# Patient Record
Sex: FEMALE | Race: WHITE | NOT HISPANIC OR LATINO | Employment: UNEMPLOYED | URBAN - METROPOLITAN AREA
[De-identification: names, ages, dates, MRNs, and addresses within clinical notes are randomized per-mention and may not be internally consistent; named-entity substitution may affect disease eponyms.]

---

## 2017-03-15 ENCOUNTER — APPOINTMENT (OUTPATIENT)
Dept: CT IMAGING | Facility: CLINIC | Age: 57
End: 2017-03-15
Attending: NURSE PRACTITIONER
Payer: COMMERCIAL

## 2017-03-15 ENCOUNTER — HOSPITAL ENCOUNTER (EMERGENCY)
Facility: CLINIC | Age: 57
Discharge: HOME | End: 2017-03-15
Attending: NURSE PRACTITIONER | Admitting: NURSE PRACTITIONER
Payer: COMMERCIAL

## 2017-03-15 VITALS
WEIGHT: 180 LBS | SYSTOLIC BLOOD PRESSURE: 119 MMHG | BODY MASS INDEX: 28.19 KG/M2 | DIASTOLIC BLOOD PRESSURE: 71 MMHG | TEMPERATURE: 97.6 F | RESPIRATION RATE: 18 BRPM | HEART RATE: 74 BPM | OXYGEN SATURATION: 99 %

## 2017-03-15 DIAGNOSIS — R10.13 ABDOMINAL PAIN, EPIGASTRIC: ICD-10-CM

## 2017-03-15 LAB
ALBUMIN SERPL-MCNC: 3.7 G/DL (ref 3.4–5)
ALBUMIN UR-MCNC: NEGATIVE MG/DL
ALP SERPL-CCNC: 70 U/L (ref 40–150)
ALT SERPL W P-5'-P-CCNC: 22 U/L (ref 0–50)
ANION GAP SERPL CALCULATED.3IONS-SCNC: 8 MMOL/L (ref 3–14)
APPEARANCE UR: CLEAR
AST SERPL W P-5'-P-CCNC: 17 U/L (ref 0–45)
BACTERIA #/AREA URNS HPF: ABNORMAL /HPF
BILIRUB SERPL-MCNC: 0.4 MG/DL (ref 0.2–1.3)
BILIRUB UR QL STRIP: NEGATIVE
BUN SERPL-MCNC: 14 MG/DL (ref 7–30)
CALCIUM SERPL-MCNC: 8.5 MG/DL (ref 8.5–10.1)
CHLORIDE SERPL-SCNC: 106 MMOL/L (ref 94–109)
CO2 SERPL-SCNC: 28 MMOL/L (ref 20–32)
COLOR UR AUTO: ABNORMAL
CREAT SERPL-MCNC: 0.84 MG/DL (ref 0.52–1.04)
D DIMER PPP FEU-MCNC: 0.4 UG/ML FEU (ref 0–0.5)
ERYTHROCYTE [DISTWIDTH] IN BLOOD BY AUTOMATED COUNT: 14.7 % (ref 10–15)
GFR SERPL CREATININE-BSD FRML MDRD: 70 ML/MIN/1.7M2
GLUCOSE SERPL-MCNC: 77 MG/DL (ref 70–99)
GLUCOSE UR STRIP-MCNC: NEGATIVE MG/DL
HCT VFR BLD AUTO: 37 % (ref 35–47)
HGB BLD-MCNC: 11.9 G/DL (ref 11.7–15.7)
HGB UR QL STRIP: NEGATIVE
INTERPRETATION ECG - MUSE: NORMAL
KETONES UR STRIP-MCNC: NEGATIVE MG/DL
LEUKOCYTE ESTERASE UR QL STRIP: ABNORMAL
LIPASE SERPL-CCNC: 105 U/L (ref 73–393)
MCH RBC QN AUTO: 28.8 PG (ref 26.5–33)
MCHC RBC AUTO-ENTMCNC: 32.2 G/DL (ref 31.5–36.5)
MCV RBC AUTO: 90 FL (ref 78–100)
MUCOUS THREADS #/AREA URNS LPF: PRESENT /LPF
NITRATE UR QL: NEGATIVE
PH UR STRIP: 5 PH (ref 5–7)
PLATELET # BLD AUTO: 263 10E9/L (ref 150–450)
POTASSIUM SERPL-SCNC: 3.4 MMOL/L (ref 3.4–5.3)
PROT SERPL-MCNC: 6.5 G/DL (ref 6.8–8.8)
RBC # BLD AUTO: 4.13 10E12/L (ref 3.8–5.2)
RBC #/AREA URNS AUTO: <1 /HPF (ref 0–2)
SODIUM SERPL-SCNC: 142 MMOL/L (ref 133–144)
SP GR UR STRIP: 1 (ref 1–1.03)
SQUAMOUS #/AREA URNS AUTO: <1 /HPF (ref 0–1)
TROPONIN I SERPL-MCNC: NORMAL UG/L (ref 0–0.04)
URN SPEC COLLECT METH UR: ABNORMAL
UROBILINOGEN UR STRIP-MCNC: 0 MG/DL (ref 0–2)
WBC # BLD AUTO: 5.5 10E9/L (ref 4–11)
WBC #/AREA URNS AUTO: 2 /HPF (ref 0–2)

## 2017-03-15 PROCEDURE — 81001 URINALYSIS AUTO W/SCOPE: CPT | Performed by: NURSE PRACTITIONER

## 2017-03-15 PROCEDURE — 85027 COMPLETE CBC AUTOMATED: CPT | Performed by: NURSE PRACTITIONER

## 2017-03-15 PROCEDURE — 25000128 H RX IP 250 OP 636: Performed by: NURSE PRACTITIONER

## 2017-03-15 PROCEDURE — 84484 ASSAY OF TROPONIN QUANT: CPT | Performed by: NURSE PRACTITIONER

## 2017-03-15 PROCEDURE — 99285 EMERGENCY DEPT VISIT HI MDM: CPT | Mod: 25

## 2017-03-15 PROCEDURE — 96360 HYDRATION IV INFUSION INIT: CPT

## 2017-03-15 PROCEDURE — 96361 HYDRATE IV INFUSION ADD-ON: CPT

## 2017-03-15 PROCEDURE — 80053 COMPREHEN METABOLIC PANEL: CPT | Performed by: NURSE PRACTITIONER

## 2017-03-15 PROCEDURE — 83690 ASSAY OF LIPASE: CPT | Performed by: NURSE PRACTITIONER

## 2017-03-15 PROCEDURE — 74177 CT ABD & PELVIS W/CONTRAST: CPT

## 2017-03-15 PROCEDURE — 93005 ELECTROCARDIOGRAM TRACING: CPT

## 2017-03-15 PROCEDURE — 25500064 ZZH RX 255 OP 636: Performed by: NURSE PRACTITIONER

## 2017-03-15 PROCEDURE — 85379 FIBRIN DEGRADATION QUANT: CPT | Performed by: NURSE PRACTITIONER

## 2017-03-15 RX ORDER — SUCRALFATE ORAL 1 G/10ML
1 SUSPENSION ORAL 4 TIMES DAILY
Qty: 200 ML | Refills: 0 | Status: SHIPPED | OUTPATIENT
Start: 2017-03-15 | End: 2017-03-20

## 2017-03-15 RX ORDER — IOPAMIDOL 755 MG/ML
500 INJECTION, SOLUTION INTRAVASCULAR ONCE
Status: COMPLETED | OUTPATIENT
Start: 2017-03-15 | End: 2017-03-15

## 2017-03-15 RX ADMIN — IOPAMIDOL 91 ML: 755 INJECTION, SOLUTION INTRAVENOUS at 15:54

## 2017-03-15 RX ADMIN — SODIUM CHLORIDE 1000 ML: 9 INJECTION, SOLUTION INTRAVENOUS at 14:03

## 2017-03-15 RX ADMIN — SODIUM CHLORIDE 63 ML: 9 INJECTION, SOLUTION INTRAVENOUS at 15:54

## 2017-03-15 ASSESSMENT — ENCOUNTER SYMPTOMS
DIARRHEA: 0
NAUSEA: 0
CONSTIPATION: 0
COUGH: 0
VOMITING: 0
BACK PAIN: 1
ABDOMINAL PAIN: 1
SHORTNESS OF BREATH: 0

## 2017-03-15 NOTE — ED NOTES
Pt here with chest pain and back pain that has been ongoing to 2 weeks, pt has tried multiple things, heat pad, massage, ibuprofen without relief. Denies any more symptoms.

## 2017-03-15 NOTE — ED NOTES
Pt felt she is unable to provide urine sample at this time and would like IV fluids to continue for a while before attempting to give sample.

## 2017-03-15 NOTE — ED AVS SNAPSHOT
[facility] Emergency Department    [address]    [facility] [address]    Phone:  [phone]    Fax:  [phone]                                       Colleen Connelly   MRN: [id number]    Department:  [facility] Emergency Department   Date of Visit:  3/15/2017           After Visit Summary Signature Page     I have received my discharge instructions, and my questions have been answered. I have discussed any challenges I see with this plan with the nurse or doctor.    ..........................................................................................................................................  Patient/Patient Representative Signature      ..........................................................................................................................................  Patient Representative Print Name and Relationship to Patient    ..................................................               ................................................  Date                                            Time    ..........................................................................................................................................  Reviewed by Signature/Title    ...................................................              ..............................................  Date                                                            Time

## 2017-03-15 NOTE — ED AVS SNAPSHOT
[facility] Emergency Department    [address]    [facility] [address]    Phone:  [phone]    Fax:  [phone]                                       Colleen Connelly   MRN: [id number]    Department:  [facility] Emergency Department   Date of Visit:  3/15/2017           Patient Information     Date Of Birth          1960        Your diagnoses for this visit were:     Abdominal pain, epigastric        You were seen by Shashi Haro APRN CNP.      Follow-up Information     Follow up with Saray Simpson In 1 week.    Specialty:  Physician Assistant    Why:  if continuned symptoms or sooner if worsening    Contact information:    [facility]  [address] [place] [address] [phone]          Follow up with [facility] Emergency Department.    Specialty:  EMERGENCY MEDICINE    Why:  If symptoms worsen    Contact information:    [address]  [facility] [id number] [phone]        Discharge Instructions         To help with constipation you can try Miralax, or Senna.  If you what to move things alone very quickly try Magnesium citrate.     Epigastric Pain (Uncertain Cause)    Epigastric pain can be a sign of disease in the upper abdomen. Common causes include:    Acid reflux (stomach acid flowing up into the esophagus)    Gastritis (irritation of the stomach lining)    Peptic Ulcer Disease    Inflammation of the pancreas    Gallstone    Infection in the gallbladder  Pain may be dull or burning. It may spread upward to the chest or to the back. There may be other symptoms such as belching, bloating, cramps or hunger pains. There may be weight loss or poor appetite, nausea or vomiting.  Since the diagnosis of your pain is not certain yet, further tests may sometimes be needed. Sometimes the doctor will treat you for the most likely condition to see if there is improvement before doing further tests.  Home  care  Medicines    Antacids help neutralize the normal acids in your stomach. Examples are Maalox, Mylanta, Rolaids, and Tums. If you don t like the liquid, you can also try a chewable one. You may find one works better than another for you. Overuse can cause diarrhea or constipation.    Acid blockers (H2 blockers) decrease acid production. Examples are cimetidine (Tagamet), famotidine (Pepcid) and ranitidine (Zantac).    Acid inhibitors (PPIs) decrease acid production in a different way than the blockers. You may find they work better, but can take a little longer to take effect.  Examples are omeprazole (Prilosec), lansoprazole (Prevacid), pantoprazole (Protonix), rabeprazole (Aciphex), and esomeprazole (Nexium).    Take an antacid 30-60 minutes after eating and at bedtime, but not at the same time as an acid blocker.    Try not to take NSAIDs. Aspirin may also cause problems, but if taking it for your heart or other medical reasons, talk to your doctor before stopping it; you do not want to cause a worse problem, like a heart attack or stroke.  Diet    If certain foods seem to cause your spasm, try to avoid them.     Eat slowly and chew food well before swallowing. Symptoms of gastritis can be worsened by certain foods. Limit or avoid fatty, fried, and spicy foods, as well as coffee, chocolate, mint, and foods with high acid content such as tomatoes and citrus fruit and juices (orange, grapefruit, lemon).    Avoid alcohol, caffeine, and tobacco, which can delay healing and worsen your problem.    Try eating smaller meals with snacks in between  Follow-up care  Follow up with your healthcare provider or as advised.  When to seek medical advice  Call your healthcare provider right away if any of the following occur:    Stomach pain worsens or moves to the right lower part of the abdomen    Chest pain appears, or if it worsens or spreads to the chest, back, neck, shoulder, or arm    Frequent vomiting (can t keep  down liquids)    Blood in the stool or vomit (red or black color)    Feeling weak or dizzy, fainting, or having trouble breathing    Fever of 100.4 F (38 C) or higher, or as directed by your healthcare provider    Abdominal swelling                3197-1670 [employer]. [address] [place] [address] 10472. All rights reserved. This information is not intended as a substitute for professional medical care. Always follow your healthcare professional's instructions.          24 Hour Appointment Hotline       To make an appointment at any [facility], call [phone] ([phone]). If you don't have a family doctor or clinic, we will help you find one. [facility] clinics are conveniently located to serve the needs of you and your family.             Review of your medicines      START taking        Dose / Directions Last dose taken    omeprazole 20 MG CR capsule   Commonly known as:  priLOSEC   Dose:  20 mg   Quantity:  30 capsule        Take 1 capsule (20 mg) by mouth daily   Refills:  0        sucralfate 1 GM/10ML suspension   Commonly known as:  CARAFATE   Dose:  1 g   Quantity:  200 mL        Take 10 mLs (1 g) by mouth 4 times daily for 5 days   Refills:  0          Our records show that you are taking the medicines listed below. If these are incorrect, please call your family doctor or clinic.        Dose / Directions Last dose taken    BENADRYL ALLERGY PO        Refills:  0        IMITREX PO        Refills:  0        PARoxetine 10 MG tablet   Commonly known as:  PAXIL   Dose:  10 mg   Quantity:  20 tablet        Take 1 tablet (10 mg) by mouth At Bedtime   Refills:  1                Prescriptions were sent or printed at these locations (2 Prescriptions)                   Other Prescriptions                Printed at Department/Unit printer (2 of 2)         sucralfate (CARAFATE) 1 GM/10ML suspension               omeprazole (PRILOSEC) 20 MG CR capsule                Procedures and tests  performed during your visit     CBC (platelets, no diff)    CT Abdomen Pelvis w Contrast    Comprehensive metabolic panel    D dimer quantitative    EKG 12 lead    Lipase    Saline Lock IV    Troponin I    UA reflex to Microscopic      Orders Needing Specimen Collection     None      Pending Results     Date and Time Order Name Status Description    3/15/2017 1451 CT Abdomen Pelvis w Contrast Preliminary             Pending Culture Results     No orders found from 3/13/2017 to 3/16/2017.             Test Results from your hospital stay     3/15/2017  2:30 PM - Interface, Flexilab Results      Component Results     Component Value Ref Range & Units Status    WBC 5.5 4.0 - 11.0 10e9/L Final    RBC Count 4.13 3.8 - 5.2 10e12/L Final    Hemoglobin 11.9 11.7 - 15.7 g/dL Final    Hematocrit 37.0 35.0 - 47.0 % Final    MCV 90 78 - 100 fl Final    MCH 28.8 26.5 - 33.0 pg Final    MCHC 32.2 31.5 - 36.5 g/dL Final    RDW 14.7 10.0 - 15.0 % Final    Platelet Count 263 150 - 450 10e9/L Final         3/15/2017  2:50 PM - Interface, Flexilab Results      Component Results     Component Value Ref Range & Units Status    Sodium 142 133 - 144 mmol/L Final    Potassium 3.4 3.4 - 5.3 mmol/L Final    Chloride 106 94 - 109 mmol/L Final    Carbon Dioxide 28 20 - 32 mmol/L Final    Anion Gap 8 3 - 14 mmol/L Final    Glucose 77 70 - 99 mg/dL Final    Urea Nitrogen 14 7 - 30 mg/dL Final    Creatinine 0.84 0.52 - 1.04 mg/dL Final    GFR Estimate 70 >60 mL/min/1.7m2 Final    Non  GFR Calc    GFR Estimate If Black 84 >60 mL/min/1.7m2 Final    African American GFR Calc    Calcium 8.5 8.5 - 10.1 mg/dL Final    Bilirubin Total 0.4 0.2 - 1.3 mg/dL Final    Albumin 3.7 3.4 - 5.0 g/dL Final    Protein Total 6.5 (L) 6.8 - 8.8 g/dL Final    Alkaline Phosphatase 70 40 - 150 U/L Final    ALT 22 0 - 50 U/L Final    AST 17 0 - 45 U/L Final         3/15/2017  2:50 PM - Interface, Flexilab Results      Component Results     Component Value  Ref Range & Units Status    Lipase 105 73 - 393 U/L Final         3/15/2017  2:50 PM - Interface, Flexilab Results      Component Results     Component Value Ref Range & Units Status    Troponin I ES  0.000 - 0.045 ug/L Final    <0.015  The 99th percentile for upper reference range is 0.045 ug/L.  Troponin values in   the range of 0.045 - 0.120 ug/L may be associated with risks of adverse   clinical events.           3/15/2017  2:43 PM - Interface, Flexilab Results      Component Results     Component Value Ref Range & Units Status    D Dimer 0.4 0.0 - 0.50 ug/ml FEU Final    This D-dimer assay is intended for use in conjuntion with a clinical pretest   probability assessment model to exclude pulmonary embolism (PE) and as an aid   in the diagnosis of deep venous thrombosis (DVT) in outpatients suspected of   PE   or DVT. The cut-off value is 0.5 g/mL FEU.           3/15/2017  3:32 PM - Interface, Flexilab Results      Component Results     Component Value Ref Range & Units Status    Color Urine Straw  Final    Appearance Urine Clear  Final    Glucose Urine Negative NEG mg/dL Final    Bilirubin Urine Negative NEG Final    Ketones Urine Negative NEG mg/dL Final    Specific Gravity Urine 1.003 1.003 - 1.035 Final    Blood Urine Negative NEG Final    pH Urine 5.0 5.0 - 7.0 pH Final    Protein Albumin Urine Negative NEG mg/dL Final    Urobilinogen mg/dL 0.0 0.0 - 2.0 mg/dL Final    Nitrite Urine Negative NEG Final    Leukocyte Esterase Urine Trace (A) NEG Final    Source Midstream Urine  Final    RBC Urine <1 0 - 2 /HPF Final    WBC Urine 2 0 - 2 /HPF Final    Bacteria Urine Few (A) NEG /HPF Final    Squamous Epithelial /HPF Urine <1 0 - 1 /HPF Final    Mucous Urine Present (A) NEG /LPF Final         3/15/2017  4:21 PM - Interface, Radiant Ib      Narrative     CT ABDOMEN AND PELVIS WITH CONTRAST  3/15/2017 4:00 PM     HISTORY: Pain.    TECHNIQUE: Volumetric acquisition through abdomen and pelvis with IV  contrast.  91  mL Isovue-370.  Radiation dose for this scan was reduced  using automated exposure control, adjustment of the mA and/or kV  according to patient size, or iterative reconstruction technique.    COMPARISON: 8/10/2014.    FINDINGS: Liver and spleen are negative. Gallbladder is absent.  Pancreas, adrenal glands and kidneys are negative. No hydronephrosis.  Mild linear atelectasis or scarring in the lingula. Visualized lung  bases are otherwise clear.    Uterus is present. No suspicious pelvic masses. Appendix is near the  upper limits of normal for size measuring 0.7 to 0.8 cm in diameter  without adjacent inflammation and is felt to be within normal limits.  Moderate stool in the colon. Nonspecific mild fat deposition in the  wall of the right colon. Portions of the colon appear minimally  thick-walled, for example in the region of the hepatic and splenic  flexures. This is probably physiologic, but mild colitis is not  completely excluded. No bowel obstruction, ascites or free air. Mild  degenerative and hypertrophic changes in the visualized spine. Mild  curve convex to the left in the lumbar spine.        Impression     IMPRESSION:  1. Slight apparent wall thickening involving portions of the  transverse colon, probably physiologic, though mild colitis cannot be  completely excluded. Correlate clinically.  2. No other acute cause of pain identified.  3. Moderate stool in the colon.                Clinical Quality Measure: Blood Pressure Screening     Your blood pressure was checked while you were in the emergency department today. The last reading we obtained was  BP: 119/71 . Please read the guidelines below about what these numbers mean and what you should do about them.  If your systolic blood pressure (the top number) is less than 120 and your diastolic blood pressure (the bottom number) is less than 80, then your blood pressure is normal. There is nothing more that you need to do about it.  If your systolic  "blood pressure (the top number) is 120-139 or your diastolic blood pressure (the bottom number) is 80-89, your blood pressure may be higher than it should be. You should have your blood pressure rechecked within a year by a primary care provider.  If your systolic blood pressure (the top number) is 140 or greater or your diastolic blood pressure (the bottom number) is 90 or greater, you may have high blood pressure. High blood pressure is treatable, but if left untreated over time it can put you at risk for heart attack, stroke, or kidney failure. You should have your blood pressure rechecked by a primary care provider within the next 4 weeks.  If your provider in the emergency department today gave you specific instructions to follow-up with your doctor or provider even sooner than that, you should follow that instruction and not wait for up to 4 weeks for your follow-up visit.        Thank you for choosing [facility]       Thank you for choosing [facility] for your care. Our goal is always to provide you with excellent care. Hearing back from our patients is one way we can continue to improve our services. Please take a few minutes to complete the written survey that you may receive in the mail after you visit with us. Thank you!        [facility] Information     [employer] lets you send messages to your doctor, view your test results, renew your prescriptions, schedule appointments and more. To sign up, go to [email or web address] . Click on \"Log in\" on the left side of the screen, which will take you to the Welcome page. Then click on \"Sign up Now\" on the right side of the page.     You will be asked to enter the access code listed below, as well as some personal information. Please follow the directions to create your username and password.     Your access code is: [email or web address]  Expires: 2017  4:40 PM     Your access code will  in 90 days. If you need help or a new code, please call your [facility] clinic or " [phone].        Care EveryWhere ID     This is your Care EveryWhere ID. This could be used by other organizations to access your [facility] medical records  [id number]        After Visit Summary       This is your record. Keep this with you and show to your community pharmacist(s) and doctor(s) at your next visit.

## 2017-03-15 NOTE — ED PROVIDER NOTES
History     Chief Complaint:  Chest Pain, Abdominal Pain, and Back Pain     HPI   Colleen Connelly is a 56 year old female who presents for evaluation of chest pain, abdominal pain, and back pain. For about the past two weeks, the patient has had sharp mild epigastric abdominal pain with radiation into her back. She cannot identify any exacerbating or alleviating factors for this pain. She has never experienced similar pain. The patient has attempted ibuprofen, heating pads, and massages to manage her pain without relief. Currently in the ED, the patient rates her pain at a severity of 8/10. She has not had any nausea, vomiting, diarrhea, constipation, cough, or shortness of breath in association with her current pain, and she has not traveled recently.     Cardiac Risk Factors:  CAD:    Negative   Hypertension:   Negative   Hyperlipidemia:  Negative   Diabetes:   Negative   Tobacco use:   Negative   Gender:   Female  Age:    56  Familial Hx of CAD:  Negative      PE/DVT Risk Factors:   Hx of PE/DVT:   Negative   Hx of clotting disorder:  Negative   Hx of cancer:    Negative   Tobacco use:    Negative   Hormone therapy:   Negative   Pregnancy:    Negative   Prolonged immobilization:  Negative   Recent surgery:   Negative   Recent travel:    Negative   Familial Hx of PE/DVT:  Negative      Allergies:  NKDA     Medications:    SUMAtriptan Succinate (IMITREX PO)  PARoxetine (PAXIL) 10 MG tablet  DiphenhydrAMINE HCl (BENADRYL ALLERGY PO)  Ibuprofen     Past Medical History:    Anxiety  Migraine headaches     Past Surgical History:    Cholecystectomy  Cosmetic surgery     Family History:    History reviewed. No pertinent family history.     Social History:  Tobacco use:    Never smoker  Alcohol use:    Positive, rarely  Marital status:       Accompanied to ED by:  Alone      Review of Systems   Respiratory: Negative for cough and shortness of breath.    Cardiovascular: Positive for chest pain.    Gastrointestinal: Positive for abdominal pain. Negative for constipation, diarrhea, nausea and vomiting.   Musculoskeletal: Positive for back pain.   All other systems reviewed and are negative.    Physical Exam   First Vitals:  BP: 132/74  Pulse: 74  Temp: 97.6  F (36.4  C)  Resp: 18  Weight: 81.6 kg (180 lb)  SpO2: 96 %      Physical Exam  General: Alert, Mild  discomfort, well kept  Eyes: PERRL, conjunctivae pink no scleral icterus or conjunctival injection  ENT:   Moist mucus membranes, posterior oropharynx clear without erythema or exudates, No lymphadenopathy, Normal voice  Resp:  Lungs clear to auscultation bilaterally, no crackles/rubs/wheezes. Good air movement  CV:  Normal rate and rhythm, no murmurs/rubs/gallops  GI:  Abdomen soft and non-distended.  Normoactive BS.  Right lower quadrant tenderness, No guarding or rebound, No masses  Skin:  Warm, dry.  No rashes or petechiae  Musculoskeletal: Right sternal border tenderness, No peripheral edema or calf tenderness, Normal gross ROM   Neuro: Alert and oriented to person/place/time, normal sensation  Psychiatric: Normal affect, cooperative, good eye contact    Emergency Department Course   ECG (13:46:27):  Indication: Screening for cardiovascular disease.   Rate 62 bpm. RI interval 160 ms. QRS duration 86 ms. QT/QTc 434/440 ms. P-R-T axes 45 -9 11.   Interpretation: Normal sinus rhythm, Minimal voltage criteria for LVH may be normal variant, Borderline ECG  Agree with computer interpretation. Yes   Interpreted at 1349 by Dr. Taylor.      Imaging:  Radiographic findings were communicated with the family who voiced understanding of the findings.    CT abdomen Pelvis w Contrast:  IMPRESSION:  1. Slight apparent wall thickening involving portions of the transverse colon, probably physiologic, though mild colitis cannot be completely excluded. Correlate clinically.  2. No other acute cause of pain identified.  3. Moderate stool in the colon.  Preliminary report  per radiology.     Laboratory:  CBC: WNL (WBC 5.5, HGB 11.9, )  CMP: Protein total: 6.5 low, o/w WNL (Creatinine 0.84)  Lipase: 105  Troponin I 1406: <0.015  D dimer quantitative: 0.4   UA reflex to Microscopic: Trace leukocyte esterase, Few bacteria, Mucous present, o/w Negative       Interventions:  1403 NS 1,000 mL IV     Emergency Department Course:  Nursing notes and vitals reviewed.  1353: I performed an exam of the patient as documented above.     1627: I updated and reassessed the patient.     I personally reviewed the laboratory results with the Patient and answered all related questions prior to discharge.      Findings and plan explained to the Patient. Patient discharged home with instructions regarding supportive care, medications, and reasons to return. The importance of close follow-up was reviewed. The patient was prescribed Prilosec and Carafate.      Impression & Plan      Medical Decision Making:  Colleen Connelly is a 56 year old female who presented for evaluation of mid epigastric discomfort that radiates to her back. This has been ongoing for several days, however due to continued discomfort she presented for evaluation. Her examination showed mid epigastric tenderness. Therefore, CT scan was obtained without indication for acute intraabdominal process. There is some slight thickening that may represent mild colitis. This is consistent with the patients history. Her laboratory studies today show no acute findings. She has a negative troponin, negative D dimer, and normal liver function test as well as lipase. Her electrolytes were normal as well as a normal white cell count. She does not have a urinary tract infection. CT scan did indicate a fair amount of stool throughout her colon. This may represent a source of her discomfort as well as colitis. Therefore, she is advised to try over the counter remedies to help with constipation. She also describes symptoms of reflux and is therefore  given a prescription for sucralfate and omeprazole.  No indication of AAA. Follow up with primary care provider within one week's time if no improvement, sooner with worsening. She will return to the ER if she develops increased pain not helped with the above treatments, difficulty breathing, or with other concerns.     Diagnosis:    ICD-10-CM   1. Abdominal pain, epigastric R10.13       Disposition:  Discharged to home with Prilosec and Carafate.     Discharge Medications:  New Prescriptions    OMEPRAZOLE (PRILOSEC) 20 MG CR CAPSULE    Take 1 capsule (20 mg) by mouth daily    SUCRALFATE (CARAFATE) 1 GM/10ML SUSPENSION    Take 10 mLs (1 g) by mouth 4 times daily for 5 days         Kade LÓPEZ, am serving as a scribe at 1:53 PM on 3/15/2017 to document services personally performed by Shashi Haro NP, based on my observations and the provider's statements to me.    [facility] EMERGENCY DEPARTMENT       Shashi Haro APRN CNP  03/15/17 9260

## 2017-03-15 NOTE — DISCHARGE INSTRUCTIONS
To help with constipation you can try Miralax, or Senna.  If you what to move things alone very quickly try Magnesium citrate.     Epigastric Pain (Uncertain Cause)    Epigastric pain can be a sign of disease in the upper abdomen. Common causes include:    Acid reflux (stomach acid flowing up into the esophagus)    Gastritis (irritation of the stomach lining)    Peptic Ulcer Disease    Inflammation of the pancreas    Gallstone    Infection in the gallbladder  Pain may be dull or burning. It may spread upward to the chest or to the back. There may be other symptoms such as belching, bloating, cramps or hunger pains. There may be weight loss or poor appetite, nausea or vomiting.  Since the diagnosis of your pain is not certain yet, further tests may sometimes be needed. Sometimes the doctor will treat you for the most likely condition to see if there is improvement before doing further tests.  Home care  Medicines    Antacids help neutralize the normal acids in your stomach. Examples are Maalox, Mylanta, Rolaids, and Tums. If you don t like the liquid, you can also try a chewable one. You may find one works better than another for you. Overuse can cause diarrhea or constipation.    Acid blockers (H2 blockers) decrease acid production. Examples are cimetidine (Tagamet), famotidine (Pepcid) and ranitidine (Zantac).    Acid inhibitors (PPIs) decrease acid production in a different way than the blockers. You may find they work better, but can take a little longer to take effect.  Examples are omeprazole (Prilosec), lansoprazole (Prevacid), pantoprazole (Protonix), rabeprazole (Aciphex), and esomeprazole (Nexium).    Take an antacid 30-60 minutes after eating and at bedtime, but not at the same time as an acid blocker.    Try not to take NSAIDs. Aspirin may also cause problems, but if taking it for your heart or other medical reasons, talk to your doctor before stopping it; you do not want to cause a worse problem, like  a heart attack or stroke.  Diet    If certain foods seem to cause your spasm, try to avoid them.     Eat slowly and chew food well before swallowing. Symptoms of gastritis can be worsened by certain foods. Limit or avoid fatty, fried, and spicy foods, as well as coffee, chocolate, mint, and foods with high acid content such as tomatoes and citrus fruit and juices (orange, grapefruit, lemon).    Avoid alcohol, caffeine, and tobacco, which can delay healing and worsen your problem.    Try eating smaller meals with snacks in between  Follow-up care  Follow up with your healthcare provider or as advised.  When to seek medical advice  Call your healthcare provider right away if any of the following occur:    Stomach pain worsens or moves to the right lower part of the abdomen    Chest pain appears, or if it worsens or spreads to the chest, back, neck, shoulder, or arm    Frequent vomiting (can t keep down liquids)    Blood in the stool or vomit (red or black color)    Feeling weak or dizzy, fainting, or having trouble breathing    Fever of 100.4 F (38 C) or higher, or as directed by your healthcare provider    Abdominal swelling                9963-7979 [employer]. [address] [place] [address], [place] [address]. All rights reserved. This information is not intended as a substitute for professional medical care. Always follow your healthcare professional's instructions.

## 2020-03-18 ENCOUNTER — APPOINTMENT (OUTPATIENT)
Dept: CT IMAGING | Facility: CLINIC | Age: 60
End: 2020-03-18
Attending: EMERGENCY MEDICINE
Payer: COMMERCIAL

## 2020-03-18 ENCOUNTER — HOSPITAL ENCOUNTER (EMERGENCY)
Facility: CLINIC | Age: 60
Discharge: HOME | End: 2020-03-18
Attending: EMERGENCY MEDICINE | Admitting: EMERGENCY MEDICINE
Payer: COMMERCIAL

## 2020-03-18 VITALS
RESPIRATION RATE: 18 BRPM | HEART RATE: 67 BPM | DIASTOLIC BLOOD PRESSURE: 69 MMHG | WEIGHT: 175 LBS | BODY MASS INDEX: 27.41 KG/M2 | OXYGEN SATURATION: 96 % | SYSTOLIC BLOOD PRESSURE: 125 MMHG | TEMPERATURE: 97.7 F

## 2020-03-18 DIAGNOSIS — S00.83XA CONTUSION OF FACE, INITIAL ENCOUNTER: ICD-10-CM

## 2020-03-18 PROCEDURE — 99283 EMERGENCY DEPT VISIT LOW MDM: CPT | Mod: 25

## 2020-03-18 PROCEDURE — 70450 CT HEAD/BRAIN W/O DYE: CPT

## 2020-03-18 PROCEDURE — 70486 CT MAXILLOFACIAL W/O DYE: CPT

## 2020-03-18 PROCEDURE — 25000132 ZZH RX MED GY IP 250 OP 250 PS 637: Performed by: EMERGENCY MEDICINE

## 2020-03-18 PROCEDURE — 25000128 H RX IP 250 OP 636: Performed by: EMERGENCY MEDICINE

## 2020-03-18 RX ORDER — ACETAMINOPHEN 500 MG
1000 TABLET ORAL ONCE
Status: COMPLETED | OUTPATIENT
Start: 2020-03-18 | End: 2020-03-18

## 2020-03-18 RX ORDER — ONDANSETRON 4 MG/1
4 TABLET, ORALLY DISINTEGRATING ORAL ONCE
Status: COMPLETED | OUTPATIENT
Start: 2020-03-18 | End: 2020-03-18

## 2020-03-18 RX ADMIN — ONDANSETRON 4 MG: 4 TABLET, ORALLY DISINTEGRATING ORAL at 21:37

## 2020-03-18 RX ADMIN — ACETAMINOPHEN 1000 MG: 500 TABLET, FILM COATED ORAL at 21:36

## 2020-03-18 ASSESSMENT — ENCOUNTER SYMPTOMS
NECK PAIN: 0
HEADACHES: 1
ABDOMINAL PAIN: 0

## 2020-03-18 NOTE — ED AVS SNAPSHOT
[facility] Emergency Department  [address]  [facility] [address]  Phone:  [phone]  Fax:  [phone]                                    Colleen Connelly   MRN: [id number]    Department:  [facility] Emergency Department   Date of Visit:  3/18/2020           After Visit Summary Signature Page    I have received my discharge instructions, and my questions have been answered. I have discussed any challenges I see with this plan with the nurse or doctor.    ..........................................................................................................................................  Patient/Patient Representative Signature      ..........................................................................................................................................  Patient Representative Print Name and Relationship to Patient    ..................................................               ................................................  Date                                   Time    ..........................................................................................................................................  Reviewed by Signature/Title    ...................................................              ..............................................  Date                                               Time          22EPIC Rev 08/18

## 2020-03-19 NOTE — ED PROVIDER NOTES
History     Chief Complaint:  Fall and Head Injury    HPI   Colleen Connelly is a 59 year old female who presents for evaluation of a fall and head injury. Patient states she tripped on a step on her driveway and fell on her face. She denies loss of consciousness but felt disoriented afterwards. She currently complains of a 7/10 head pain and pain behind her eyes. She denies any other pain. She also felt mildly nauseous. She denies double vision, blurry vision, epistaxis, neck pain, chest pain, and abdominal pain. She is not on blood thinners.    Allergies:  No known drug allergies    Medications:    Benadryl   Paxil  Imitrex    Past Medical History:    Anxiety  Migraine  TMJ  Osteoarthritis  Vitamin D deficiency    Past Surgical History:    Cholecystectomy  Cosmetic surgery    Family History:    History reviewed. No pertinent family history.     Social History:  Smoking status: never smoker  Alcohol use: yes  Drug use: no  The patient presents to the emergency department by herself.  PCP: Saray Simpson  Marital Status:  Single     Review of Systems   HENT: Negative for nosebleeds.    Eyes: Negative for visual disturbance.   Cardiovascular: Negative for chest pain.   Gastrointestinal: Negative for abdominal pain.   Musculoskeletal: Negative for neck pain.   Neurological: Positive for headaches.   All other systems reviewed and are negative.    Physical Exam     Patient Vitals for the past 24 hrs:   BP Temp Temp src Pulse Resp SpO2 Weight   03/18/20 2230 125/69 -- -- 67 -- 94 % --   03/18/20 2200 119/64 -- -- 64 -- 92 % --   03/18/20 2140 115/66 -- -- 67 -- 93 % --   03/18/20 2115 -- -- -- -- -- 94 % --   03/18/20 2042 137/83 97.7  F (36.5  C) Oral 79 18 97 % 79.4 kg (175 lb)       Physical Exam  Vital signs reviewed.  Nursing note reviewed.  Constitutional: Well-developed, Well-nourished.  Non-diaphoretic.  Mild distress    HENT: R superior orbital swelling and ecchymosis, tenderness. No pain or instability to  palpation mandible, maxilla.  Good jaw opening.  No malocclusion.  No nasal septal hematoma.  OP clear and moist.    EYES:  PERRL.  EOMI.  NECK:  No Cspine tenderness.  Trachea midline.  Full ROM.  Supple. No stridor.  CARDIAC:  RRR.   CHEST:  No external evidence of chest trauma  PULM: Effort  Normal.  Breath sounds clear and equal bilat  ABD:  Soft, NT/ND. No guarding, no rebound.  No external evidence of abdominal trauma  : No CVA T.    BACK:  No T or L spinous process tenderness.  Pelvis stable.  EXT:  Full ROM X4.  No tenderness, edema, crepitus or obvious deformity  NEURO:  Alert, Oriented X3.  5/5 UE and LE, proximal and distal.  Sensation intact to LT.   SKIN :  Warm.  Dry.   No erythema.  No rash  PSYCH.:  Normal judgment.  Normal affect.      Emergency Department Course   Imaging:  Radiographic findings were communicated with the patient who voiced understanding of the findings.  CT Facial Bones without contrast:   IMPRESSION:   1.  There is right preseptal periorbital and supraorbital scalp soft tissue swelling.   2.  No fracture. as per radiology.    CT Head without contrast:   IMPRESSION:   1.  Right preseptal periorbital and frontal supraorbital scalp swelling. No fracture.   2.  No acute intracranial injury, hemorrhage, mass, or CT evidence of recent ischemia. as per radiology.    Interventions:  2136 Tylenol 1000 mg Oral  2137 Zofran-odt 4 mg Oral    Emergency Department Course:  Nursing notes and vitals reviewed. (2053) I performed an exam of the patient as documented above.     Medicine administered as documented above.     The patient was sent for CT while in the emergency department, findings above.     2250 I rechecked the patient and discussed the results of her workup thus far.     Findings and plan explained to the Patient. Patient discharged home with instructions regarding supportive care, medications, and reasons to return. The importance of close follow-up was reviewed.     I personally  reviewed the laboratory results with the Patient and answered all related questions prior to discharge.      Impression & Plan    Medical Decision Makin year old female presenting w/ head and facial injury s/p mechanical fall     DDx includes mechanical fall, fracture, contusion, intracranial hemorrhage, skull fracture.  Doubt seizure, syncope, CVA given history and physical exam.  No other evidence of trauma on exam including intrathoracic, intra-abdominal, spinal or extremity trauma on full primary and secondary trauma surveys.  Given mechanical fall and no other complaints, EKG and blood work deferred. Imaging sig for no acute fractures or retrobulbar hemorrhage.  Interventions as noted above.  Work-up consistent w/ contusion.  No obvious evidence of concussion on exam or in history.  At this time I feel the pt is safe for discharge.  Recommendations given regarding follow up with PCP and return to the emergency department as needed for new or worsening symptoms.  Pt counseled on all results, disposition and diagnosis.  They are understanding and agreeable to plan. Patient discharged in stable condition.      Diagnosis:    ICD-10-CM    1. Contusion of face, initial encounter  S00.83XA        Disposition:  discharged to home  Ad Ramirez  3/18/2020   [facility] EMERGENCY DEPARTMENT  Scribe Disclosure:  I, Ad Ramirez, am serving as a scribe at 8:53 PM on 3/18/2020 to document services personally performed by Arnold Yousif MD based on my observations and the provider's statements to me.        Arnold Yousif MD  20 [phone]

## 2020-03-19 NOTE — ED TRIAGE NOTES
Pt slipped in the shower about an hour ago. Large area of swelling/bruising near R eye and bruising/tenderness to the bones below R eye. Denies LOC but states laid there for a while. Tried icing at home but has become dizzy. No  Nausea.

## 2020-03-19 NOTE — DISCHARGE INSTRUCTIONS
1. -Take acetaminophen 500 to 1000 mg by mouth every 4 to 6 hours as needed for pain or fever.  Do not take more than 4000 mg in 24 hours.  Do not take within 6 hours of another acetaminophen containing medication such as norco (vicodin) or percocet.  2.  Please ice your face to help with swelling.  3.

## 2021-05-24 ENCOUNTER — HOSPITAL ENCOUNTER (EMERGENCY)
Facility: CLINIC | Age: 61
Discharge: HOME | End: 2021-05-24
Admitting: EMERGENCY MEDICINE
Payer: COMMERCIAL

## 2021-05-24 VITALS
SYSTOLIC BLOOD PRESSURE: 122 MMHG | DIASTOLIC BLOOD PRESSURE: 74 MMHG | RESPIRATION RATE: 16 BRPM | HEART RATE: 86 BPM | OXYGEN SATURATION: 99 % | TEMPERATURE: 98 F

## 2021-05-24 PROCEDURE — 99281 EMR DPT VST MAYX REQ PHY/QHP: CPT

## 2022-02-13 ENCOUNTER — HOSPITAL ENCOUNTER (EMERGENCY)
Facility: CLINIC | Age: 62
Discharge: HOME | End: 2022-02-13
Attending: EMERGENCY MEDICINE | Admitting: EMERGENCY MEDICINE
Payer: COMMERCIAL

## 2022-02-13 VITALS
DIASTOLIC BLOOD PRESSURE: 68 MMHG | TEMPERATURE: 97.4 F | SYSTOLIC BLOOD PRESSURE: 142 MMHG | RESPIRATION RATE: 18 BRPM | OXYGEN SATURATION: 98 % | HEART RATE: 66 BPM

## 2022-02-13 DIAGNOSIS — M25.561 ACUTE PAIN OF RIGHT KNEE: ICD-10-CM

## 2022-02-13 DIAGNOSIS — M66.0 RUPTURED BAKERS CYST: ICD-10-CM

## 2022-02-13 PROBLEM — R87.610 ASCUS OF CERVIX WITH NEGATIVE HIGH RISK HPV: Status: ACTIVE | Noted: 2021-05-13

## 2022-02-13 PROBLEM — M54.50 ACUTE MIDLINE LOW BACK PAIN WITHOUT SCIATICA: Status: ACTIVE | Noted: 2020-10-05

## 2022-02-13 PROBLEM — M17.9 OSTEOARTHRITIS OF KNEE: Status: ACTIVE | Noted: 2019-07-05

## 2022-02-13 PROCEDURE — 96372 THER/PROPH/DIAG INJ SC/IM: CPT | Performed by: EMERGENCY MEDICINE

## 2022-02-13 PROCEDURE — 250N000011 HC RX IP 250 OP 636: Performed by: EMERGENCY MEDICINE

## 2022-02-13 PROCEDURE — 99284 EMERGENCY DEPT VISIT MOD MDM: CPT

## 2022-02-13 PROCEDURE — 250N000013 HC RX MED GY IP 250 OP 250 PS 637: Performed by: EMERGENCY MEDICINE

## 2022-02-13 RX ORDER — NAPROXEN 500 MG/1
500 TABLET ORAL 2 TIMES DAILY WITH MEALS
Qty: 14 TABLET | Refills: 0 | Status: SHIPPED | OUTPATIENT
Start: 2022-02-13 | End: 2022-02-20

## 2022-02-13 RX ORDER — KETOROLAC TROMETHAMINE 30 MG/ML
30 INJECTION, SOLUTION INTRAMUSCULAR; INTRAVENOUS ONCE
Status: COMPLETED | OUTPATIENT
Start: 2022-02-13 | End: 2022-02-13

## 2022-02-13 RX ORDER — HYDROCODONE BITARTRATE AND ACETAMINOPHEN 5; 325 MG/1; MG/1
2 TABLET ORAL ONCE
Status: COMPLETED | OUTPATIENT
Start: 2022-02-13 | End: 2022-02-13

## 2022-02-13 RX ORDER — HYDROCODONE BITARTRATE AND ACETAMINOPHEN 5; 325 MG/1; MG/1
1 TABLET ORAL EVERY 6 HOURS PRN
Qty: 10 TABLET | Refills: 0 | Status: SHIPPED | OUTPATIENT
Start: 2022-02-13 | End: 2022-02-16

## 2022-02-13 RX ADMIN — KETOROLAC TROMETHAMINE 30 MG: 30 INJECTION, SOLUTION INTRAMUSCULAR; INTRAVENOUS at 13:39

## 2022-02-13 RX ADMIN — HYDROCODONE BITARTRATE AND ACETAMINOPHEN 2 TABLET: 5; 325 TABLET ORAL at 13:41

## 2022-02-13 ASSESSMENT — ENCOUNTER SYMPTOMS
WEAKNESS: 0
FEVER: 0
SHORTNESS OF BREATH: 0
ACTIVITY CHANGE: 0
WOUND: 0
NUMBNESS: 0

## 2022-02-13 NOTE — ED TRIAGE NOTES
Pt arrives with c/o continued left leg pain s/p Hayes's cyst diagnosis recently. Pt reports she is able to walk but it is extremely painful. ABCs intact.

## 2022-02-13 NOTE — ED PROVIDER NOTES
History     Chief Complaint:  Leg Pain       HPI   Colleen Connelly is a 61 year old female who presents with ongoing right leg and knee pain and swelling.  Patient was recently diagnosed with a Baker's cyst on February 11th, 2 days ago.  Venous duplex ultrasound at that time was negative for DVT.  X-ray reveals osteoarthritis but no fracture or dislocation.  Patient has been taking Tylenol with codeine but none today to try to help with her pain although the pain is persistent and makes it difficult for her to walk.  No numbness tingling or weakness.  No recent injury or fall.  No fever.  Patient takes gabapentin at baseline and this has not been helping with this acute pain.  She has not yet been able to follow-up with orthopedics since the diagnosis of the Hayes cyst.  Patient denies any other symptoms at this time.    2/11/22 - Venous Duplex US right lower extremity:   FINDINGS: The venous system of the right lower extremity was visualized using color-flow Doppler technique.  The common femoral, proximal deep femoral, femoral, popliteal, and visualized portions of the posterior tibial and peroneal veins show normal compressibility, color flow, and response to augmentation. The great saphenous vein has normal compression. There is a complex Baker's cyst in the popliteal fossa of the knee measuring 6.7 x 4.7 x 2.1 cm.     IMPRESSION:    1. No evidence of deep venous thrombosis. Small calf vein thrombosis cannot be completely excluded by this technique.   2. Baker's cyst popliteal fossa measuring 6.7 x 4.7 x 2.1 cm.    2/11/22 - Right Knee XR 3 Views:  FINDINGS:  3 views obtained. Suggestion of diffuse osteopenia. No acute fracture. Mild medial compartment osteoarthritic degenerative narrowing. Moderate lateral compartment osteoarthritic degenerative narrowing, increased compared to prior examination. Severe patellofemoral compartment osteoarthritic degenerative narrowing laterally. Tricompartmental osteophytosis  again noted. Small joint effusion without layering lipohemarthrosis.       ROS:  Review of Systems   Constitutional: Negative for activity change and fever.   Respiratory: Negative for shortness of breath.    Cardiovascular: Positive for leg swelling. Negative for chest pain.   Skin: Negative for rash and wound.   Neurological: Negative for weakness and numbness.   All other systems reviewed and are negative.         Allergies:  No Known Allergies     Medications:    HYDROcodone-acetaminophen (NORCO) 5-325 MG tablet  naproxen (NAPROSYN) 500 MG tablet  DiphenhydrAMINE HCl (BENADRYL ALLERGY PO)  PARoxetine (PAXIL) 10 MG tablet  SUMAtriptan Succinate (IMITREX PO)    Gabapentin    Past Medical History:    Past Medical History:   Diagnosis Date     Anxiety      Classic migraine      Patient Active Problem List   Diagnosis     Acute midline low back pain without sciatica     Anxiety state     ASCUS of cervix with negative high risk HPV     Constipation     Migraine     Osteoarthritis of knee     Rhinitis     Temporomandibular joint disorder     Vitamin D deficiency        Past Surgical History:    Past Surgical History:   Procedure Laterality Date     CHOLECYSTECTOMY       COSMETIC SURGERY          Family History:    Noncontributory.    Social History:   reports that she has never smoked. She does not have any smokeless tobacco history on file. She reports current alcohol use. She reports that she does not use drugs.  PCP: Saray Simpson     Physical Exam     Patient Vitals for the past 24 hrs:   BP Temp Pulse Resp SpO2   02/13/22 1305 (!) 142/68 97.4  F (36.3  C) 66 18 98 %        Physical Exam  General: Resting comfortably  Head:  Scalp, face, and head appear normal  Eyes:  Pupils equal, round, and reactive to light    Conjunctivae noninjected and sclera white  ENT:    The nose is normal    Ears/pinnae are normal  Neck:  Normal range of motion  CV:  RRR, no M/R/G  Resp:  CTAB, no increased WOB  MSK:  Nonpitting  swelling of the right lower extremity in the posterior of the right knee extending down towards her ankle.  No focal bony tenderness.  Full range of motion of the right knee although this does increase her pain.  Possible small palpable knee joint effusion.  No ecchymosis warmth erythema.  CMS is intact distally in the right lower extremity.  DP pulses 2+.  Toes are warm and well-perfused.  No evidence of trauma.  Skin:  No rash or lesions noted.  Neuro: Speech is normal and fluent    Moves all extremities spontaneously  Psych:  Awake, Alert. Tearful affect.     Appropriate interactions           Emergency Department Course       Procedures   None    Emergency Department Course:             Reviewed:  I reviewed nursing notes, vitals, past medical history and Care Everywhere    Assessments:   I obtained history and examined the patient as noted above.    I rechecked the patient and explained findings.       Consults:   None    Interventions:  Medications   ketorolac (TORADOL) injection 30 mg (30 mg Intramuscular Given 2/13/22 1339)   HYDROcodone-acetaminophen (NORCO) 5-325 MG per tablet 2 tablet (2 tablets Oral Given 2/13/22 1341)        Disposition:  The patient was discharged to home.     Impression & Plan      Covid-19  Colleen Connelly was evaluated during a global COVID-19 pandemic, which necessitated consideration that the patient might be at risk for infection with the SARS-CoV-2 virus that causes COVID-19.   Applicable protocols for evaluation were followed during the patient's care.   COVID-19 was considered as part of the patient's evaluation.    Medical Decision Making:  Colleen Connelly is a 61 year old female who presents for evaluation of ongoing right lower extremity pain due to a known Baker's cyst which is likely ruptured.  On my evaluation she is well-appearing, hemodynamically stable and afebrile.  No neurovascular compromise.  No evidence of infection.  Patient is not taking any consistent  anti-inflammatories.  She is occasionally using Tylenol with codeine.  The leg was wrapped with an Ace wrap.  She was treated with ketorolac.  I recommended ongoing treatment with Norco, Aleve and Tylenol as needed.  I recommended rest ice elevation and compression of the extremity.  Close follow-up with her orthopedic physician at [facility] was recommended.  Patient was agreeable with the plan of care.  No further ED testing is indicated at this time.  Patient was discharged in stable condition.    Diagnosis:    ICD-10-CM    1. Acute pain of right knee  M25.561    2. Ruptured Bakers cyst  M66.0         Discharge Medications:  New Prescriptions    HYDROCODONE-ACETAMINOPHEN (NORCO) 5-325 MG TABLET    Take 1 tablet by mouth every 6 hours as needed for severe pain    NAPROXEN (NAPROSYN) 500 MG TABLET    Take 1 tablet (500 mg) by mouth 2 times daily (with meals) for 7 days        2/13/2022   Thai Silver MD Daro, Ryan Clay, MD  02/13/22 6627

## 2022-05-22 ENCOUNTER — APPOINTMENT (OUTPATIENT)
Dept: GENERAL RADIOLOGY | Facility: CLINIC | Age: 62
End: 2022-05-22
Attending: PHYSICIAN ASSISTANT
Payer: COMMERCIAL

## 2022-05-22 ENCOUNTER — HOSPITAL ENCOUNTER (EMERGENCY)
Facility: CLINIC | Age: 62
Discharge: HOME | End: 2022-05-22
Attending: PHYSICIAN ASSISTANT | Admitting: PHYSICIAN ASSISTANT
Payer: COMMERCIAL

## 2022-05-22 VITALS
DIASTOLIC BLOOD PRESSURE: 60 MMHG | RESPIRATION RATE: 18 BRPM | TEMPERATURE: 97.7 F | HEART RATE: 68 BPM | OXYGEN SATURATION: 96 % | SYSTOLIC BLOOD PRESSURE: 97 MMHG

## 2022-05-22 DIAGNOSIS — M25.561 ACUTE PAIN OF RIGHT KNEE: ICD-10-CM

## 2022-05-22 PROCEDURE — 99283 EMERGENCY DEPT VISIT LOW MDM: CPT

## 2022-05-22 PROCEDURE — 250N000013 HC RX MED GY IP 250 OP 250 PS 637: Performed by: PHYSICIAN ASSISTANT

## 2022-05-22 PROCEDURE — 73562 X-RAY EXAM OF KNEE 3: CPT | Mod: RT

## 2022-05-22 RX ORDER — ACETAMINOPHEN 500 MG
1000 TABLET ORAL EVERY 4 HOURS PRN
Status: DISCONTINUED | OUTPATIENT
Start: 2022-05-22 | End: 2022-05-22 | Stop reason: HOSPADM

## 2022-05-22 RX ADMIN — ACETAMINOPHEN 1000 MG: 500 TABLET, FILM COATED ORAL at 17:15

## 2022-05-22 ASSESSMENT — ENCOUNTER SYMPTOMS
WEAKNESS: 0
CHILLS: 0
ARTHRALGIAS: 1
NUMBNESS: 0
COLOR CHANGE: 0
FEVER: 0

## 2022-05-22 NOTE — DISCHARGE INSTRUCTIONS
You may take 500-1000 mg of Tylenol every 6 hours.  Do not exceed 3000 mg of acetaminophen (Tylenol) daily.      Continue medications as prescribed.    Ice, elevation, and close follow-up with orthopedics.    Return to emergency department for redness, fevers, increasing pain, or any other concerning symptoms develop.

## 2022-05-22 NOTE — LETTER
May 22, 2022      To Whom It May Concern:      Colleen Connelly was seen in our Emergency Department today, 05/22/22.  I expect her condition to improve over the next 3 days.  She may return to work/school when improved.    Sincerely,        WENDY Galeas

## 2022-05-22 NOTE — ED TRIAGE NOTES
Patient c/o increased right knee pain. Patient has known osteoarthritis in bilateral knees. Woke up this morning and unable to straighten out her leg. Denies trauma. Denies reddness/fevers. ABCs intact. Normal pain medications not providing relief.

## 2022-05-23 NOTE — ED PROVIDER NOTES
History     Chief Complaint:  Knee Pain       HPI   Colleen Connelly is a 62 year old female who presents with acute on chronic right knee pain.  The patient reports waking up this morning with right knee pain.  She reports history of bilateral knee arthritis and yesterday she was on her feet more than normal.  She reports she received an injection to the right knee a few weeks ago that has provided minimal relief.  Patient where she is prescribed gabapentin and has taken some Tylenol without significant relief.  Denies distal numbness or tingling.  Denies trauma.  Denies fevers/chills and overlying redness    Allergies:  No Known Allergies     Medications:    DiphenhydrAMINE HCl (BENADRYL ALLERGY PO)  PARoxetine (PAXIL) 10 MG tablet  SUMAtriptan Succinate (IMITREX PO)        Past Medical History:    Past Medical History:   Diagnosis Date     Anxiety      Classic migraine        Patient Active Problem List    Diagnosis Date Noted     ASCUS of cervix with negative high risk HPV 05/13/2021     Priority: Medium     Formatting of this note might be different from the original.  [facility] Review:    History:    2009 ASCUS  2013 ASCUS HPV NEGATIVE  2016 NILM HPV NEGATIVE  2021 ASCUS HPV NEGATIVE    Plan, per ASCCP guidelines: Repeat co-test in 3 years (2024)       Acute midline low back pain without sciatica 10/05/2020     Priority: Medium     Osteoarthritis of knee 07/05/2019     Priority: Medium     Rhinitis 02/08/2013     Priority: Medium     Vitamin D deficiency 08/06/2009     Priority: Medium     Overview:   LW Onset:  8/2009       Constipation 04/26/2007     Priority: Medium     Formatting of this note might be different from the original.  Constipation  NOS       Anxiety state 06/23/2005     Priority: Medium     Overview:   LW Onset:  05/05  Formatting of this note might be different from the original.  LW Onset:  05/05  ; Anxiety  NOS       Migraine 06/07/2003     Priority: Medium     Formatting of this note might be  different from the original.  Migraine Without Aura       Temporomandibular joint disorder 06/07/2003     Priority: Medium     Formatting of this note might be different from the original.  Temporomandibular Joint Dis  NOS          Past Surgical History:    Past Surgical History:   Procedure Laterality Date     CHOLECYSTECTOMY       COSMETIC SURGERY          Family History:    family history is not on file.    Social History:  Presents to the ED alone    PCP: Saray Simpson     Review of Systems   Constitutional: Negative for chills and fever.   Musculoskeletal: Positive for arthralgias.   Skin: Negative for color change.   Neurological: Negative for weakness and numbness.       Physical Exam     Patient Vitals for the past 24 hrs:   BP Temp Temp src Pulse Resp SpO2   05/22/22 1622 97/60 -- -- -- -- 96 %   05/22/22 1621 -- 97.7  F (36.5  C) Temporal 68 18 93 %        Physical Exam  Constitutional: alert, cooperative   CV: 2+ DP and PT pulses, brisk distal cap refill  Pulm: No respiratory distress  MSK: Left knee swollen compared to right. Right knee without joint effusion. No overlying erythema or warmth. Sitting in flexed position, increased pain with extension of the knee. Diffuse tenderness to the anterior medial inferior knee.  Neurological: Alert, attentive  5/5 strength to the DF and PF motor functions; sensation intact to RLE.   Skin: Skin is warm and dry.   Psych: Normal mood and affect   Emergency Department Course     Imaging:    XR Knee Right 3 Views   Final Result   IMPRESSION: No acute fracture or malalignment. Severe patellofemoral, moderate medial, and moderate lateral compartment degenerative changes. Moderate knee joint effusion. Posterior bodies, may be within the popliteal tendon sheath or popliteal cyst.           Interventions:  Medications   acetaminophen (TYLENOL) tablet 1,000 mg (1,000 mg Oral Given 5/22/22 1496)        Emergency Department Course:  Past medical records, nursing notes, and  vitals reviewed.  I performed an exam of the patient and obtained history, as documented above.    I rechecked the patient. Findings and plan explained to the Patient. Patient was discharged.    Impression & Plan      Medical Decision Making:  Colleen Connelly is a 62 year old female presents emergency department with acute on chronic right knee pain.  On examination, the left knee is actually more swollen than the right.  There is no large joint effusion, overlying erythema or warmth to the knee to suggest septic arthritis or gouty arthritis.  There is no trauma and x-ray without evidence of fracture.  I discussed x-ray finding of significant arthritis and explained this is likely contributing to patient's discomfort.  She is told she can take ibuprofen in the past.  I recommended Tylenol and continuing gabapentin.  Plan follow-up closely with orthopedics.  Ace wrap provided for comfort.  Recommended rest, ice, and elevation.  Patient agrees with plan all questions and concerns addressed prior to discharge home.  Return cautions discussed including increasing pain, redness to the knee, fevers, or any other concerning symptoms develop.    Diagnosis:    ICD-10-CM    1. Acute pain of right knee  M25.561         Discharge Medications:     Medication List      There are no discharge medications for this visit.          5/22/2022   Debbie Walton PA-C, PA-C  05/22/22 1937

## 2022-12-02 ENCOUNTER — HOSPITAL ENCOUNTER (EMERGENCY)
Facility: CLINIC | Age: 62
Discharge: HOME | End: 2022-12-02
Attending: EMERGENCY MEDICINE | Admitting: EMERGENCY MEDICINE
Payer: COMMERCIAL

## 2022-12-02 ENCOUNTER — APPOINTMENT (OUTPATIENT)
Dept: CT IMAGING | Facility: CLINIC | Age: 62
End: 2022-12-02
Attending: EMERGENCY MEDICINE
Payer: COMMERCIAL

## 2022-12-02 VITALS
HEART RATE: 69 BPM | SYSTOLIC BLOOD PRESSURE: 132 MMHG | TEMPERATURE: 98.6 F | OXYGEN SATURATION: 95 % | DIASTOLIC BLOOD PRESSURE: 96 MMHG | RESPIRATION RATE: 18 BRPM

## 2022-12-02 DIAGNOSIS — R10.13 ABDOMINAL PAIN, EPIGASTRIC: ICD-10-CM

## 2022-12-02 LAB
ALBUMIN SERPL BCG-MCNC: 4.4 G/DL (ref 3.5–5.2)
ALBUMIN UR-MCNC: NEGATIVE MG/DL
ALP SERPL-CCNC: 95 U/L (ref 35–104)
ALT SERPL W P-5'-P-CCNC: 16 U/L (ref 10–35)
ANION GAP SERPL CALCULATED.3IONS-SCNC: 12 MMOL/L (ref 7–15)
APPEARANCE UR: CLEAR
AST SERPL W P-5'-P-CCNC: 19 U/L (ref 10–35)
BACTERIA #/AREA URNS HPF: ABNORMAL /HPF
BASOPHILS # BLD AUTO: 0 10E3/UL (ref 0–0.2)
BASOPHILS NFR BLD AUTO: 1 %
BILIRUB SERPL-MCNC: 0.3 MG/DL
BILIRUB UR QL STRIP: NEGATIVE
BUN SERPL-MCNC: 17.6 MG/DL (ref 8–23)
CALCIUM SERPL-MCNC: 9.5 MG/DL (ref 8.8–10.2)
CHLORIDE SERPL-SCNC: 100 MMOL/L (ref 98–107)
COLOR UR AUTO: ABNORMAL
CREAT SERPL-MCNC: 0.76 MG/DL (ref 0.51–0.95)
DEPRECATED HCO3 PLAS-SCNC: 28 MMOL/L (ref 22–29)
EOSINOPHIL # BLD AUTO: 0.5 10E3/UL (ref 0–0.7)
EOSINOPHIL NFR BLD AUTO: 7 %
ERYTHROCYTE [DISTWIDTH] IN BLOOD BY AUTOMATED COUNT: 14.1 % (ref 10–15)
GFR SERPL CREATININE-BSD FRML MDRD: 89 ML/MIN/1.73M2
GLUCOSE SERPL-MCNC: 93 MG/DL (ref 70–99)
GLUCOSE UR STRIP-MCNC: NEGATIVE MG/DL
HCT VFR BLD AUTO: 39 % (ref 35–47)
HGB BLD-MCNC: 12.3 G/DL (ref 11.7–15.7)
HGB UR QL STRIP: NEGATIVE
HOLD SPECIMEN: NORMAL
IMM GRANULOCYTES # BLD: 0 10E3/UL
IMM GRANULOCYTES NFR BLD: 0 %
KETONES UR STRIP-MCNC: NEGATIVE MG/DL
LEUKOCYTE ESTERASE UR QL STRIP: ABNORMAL
LIPASE SERPL-CCNC: 22 U/L (ref 13–60)
LYMPHOCYTES # BLD AUTO: 1.3 10E3/UL (ref 0.8–5.3)
LYMPHOCYTES NFR BLD AUTO: 20 %
MCH RBC QN AUTO: 28.1 PG (ref 26.5–33)
MCHC RBC AUTO-ENTMCNC: 31.5 G/DL (ref 31.5–36.5)
MCV RBC AUTO: 89 FL (ref 78–100)
MONOCYTES # BLD AUTO: 0.4 10E3/UL (ref 0–1.3)
MONOCYTES NFR BLD AUTO: 7 %
MUCOUS THREADS #/AREA URNS LPF: PRESENT /LPF
NEUTROPHILS # BLD AUTO: 4.1 10E3/UL (ref 1.6–8.3)
NEUTROPHILS NFR BLD AUTO: 65 %
NITRATE UR QL: NEGATIVE
NRBC # BLD AUTO: 0 10E3/UL
NRBC BLD AUTO-RTO: 0 /100
PH UR STRIP: 6 [PH] (ref 5–7)
PLATELET # BLD AUTO: 279 10E3/UL (ref 150–450)
POTASSIUM SERPL-SCNC: 4.2 MMOL/L (ref 3.4–5.3)
PROT SERPL-MCNC: 7.4 G/DL (ref 6.4–8.3)
RBC # BLD AUTO: 4.37 10E6/UL (ref 3.8–5.2)
RBC URINE: 5 /HPF
SODIUM SERPL-SCNC: 140 MMOL/L (ref 136–145)
SP GR UR STRIP: 1.02 (ref 1–1.03)
SQUAMOUS EPITHELIAL: 5 /HPF
UROBILINOGEN UR STRIP-MCNC: NORMAL MG/DL
WBC # BLD AUTO: 6.3 10E3/UL (ref 4–11)
WBC URINE: 3 /HPF

## 2022-12-02 PROCEDURE — 81001 URINALYSIS AUTO W/SCOPE: CPT | Performed by: EMERGENCY MEDICINE

## 2022-12-02 PROCEDURE — 250N000013 HC RX MED GY IP 250 OP 250 PS 637: Performed by: EMERGENCY MEDICINE

## 2022-12-02 PROCEDURE — 83690 ASSAY OF LIPASE: CPT | Performed by: EMERGENCY MEDICINE

## 2022-12-02 PROCEDURE — 250N000009 HC RX 250: Performed by: EMERGENCY MEDICINE

## 2022-12-02 PROCEDURE — 258N000003 HC RX IP 258 OP 636: Performed by: EMERGENCY MEDICINE

## 2022-12-02 PROCEDURE — 80053 COMPREHEN METABOLIC PANEL: CPT | Performed by: EMERGENCY MEDICINE

## 2022-12-02 PROCEDURE — 85025 COMPLETE CBC W/AUTO DIFF WBC: CPT | Performed by: EMERGENCY MEDICINE

## 2022-12-02 PROCEDURE — 250N000011 HC RX IP 250 OP 636: Performed by: EMERGENCY MEDICINE

## 2022-12-02 PROCEDURE — 36415 COLL VENOUS BLD VENIPUNCTURE: CPT | Performed by: EMERGENCY MEDICINE

## 2022-12-02 PROCEDURE — 87086 URINE CULTURE/COLONY COUNT: CPT | Performed by: EMERGENCY MEDICINE

## 2022-12-02 PROCEDURE — 99285 EMERGENCY DEPT VISIT HI MDM: CPT | Mod: 25

## 2022-12-02 PROCEDURE — 96360 HYDRATION IV INFUSION INIT: CPT | Mod: 59

## 2022-12-02 PROCEDURE — 74177 CT ABD & PELVIS W/CONTRAST: CPT

## 2022-12-02 RX ORDER — MAGNESIUM HYDROXIDE/ALUMINUM HYDROXICE/SIMETHICONE 120; 1200; 1200 MG/30ML; MG/30ML; MG/30ML
30 SUSPENSION ORAL ONCE
Status: COMPLETED | OUTPATIENT
Start: 2022-12-02 | End: 2022-12-02

## 2022-12-02 RX ORDER — SUCRALFATE ORAL 1 G/10ML
1 SUSPENSION ORAL 4 TIMES DAILY PRN
Qty: 420 ML | Refills: 0 | Status: SHIPPED | OUTPATIENT
Start: 2022-12-02 | End: 2023-06-26 | Stop reason: ALTCHOICE

## 2022-12-02 RX ORDER — IOPAMIDOL 755 MG/ML
500 INJECTION, SOLUTION INTRAVASCULAR ONCE
Status: COMPLETED | OUTPATIENT
Start: 2022-12-02 | End: 2022-12-02

## 2022-12-02 RX ORDER — FAMOTIDINE 20 MG/1
20 TABLET, FILM COATED ORAL ONCE
Status: COMPLETED | OUTPATIENT
Start: 2022-12-02 | End: 2022-12-02

## 2022-12-02 RX ORDER — PANTOPRAZOLE SODIUM 40 MG/1
40 TABLET, DELAYED RELEASE ORAL DAILY
Qty: 30 TABLET | Refills: 0 | Status: SHIPPED | OUTPATIENT
Start: 2022-12-02 | End: 2023-01-01

## 2022-12-02 RX ADMIN — SODIUM CHLORIDE 1000 ML: 9 INJECTION, SOLUTION INTRAVENOUS at 22:06

## 2022-12-02 RX ADMIN — IOPAMIDOL 90 ML: 755 INJECTION, SOLUTION INTRAVENOUS at 22:39

## 2022-12-02 RX ADMIN — SODIUM CHLORIDE 64 ML: 9 INJECTION, SOLUTION INTRAVENOUS at 22:39

## 2022-12-02 RX ADMIN — ALUMINUM HYDROXIDE, MAGNESIUM HYDROXIDE, AND DIMETHICONE 30 ML: 200; 20; 200 SUSPENSION ORAL at 22:05

## 2022-12-02 RX ADMIN — FAMOTIDINE 20 MG: 20 TABLET ORAL at 22:06

## 2022-12-02 ASSESSMENT — ENCOUNTER SYMPTOMS
VOMITING: 0
ABDOMINAL PAIN: 1
NAUSEA: 0
FEVER: 0
DIARRHEA: 0

## 2022-12-02 ASSESSMENT — ACTIVITIES OF DAILY LIVING (ADL): ADLS_ACUITY_SCORE: 35

## 2022-12-02 NOTE — ED TRIAGE NOTES
2 weeks of abdominal pain off and on for 2 weeks.  Took prilosec to help, as she has had ulcers in the past and this feels similar. Denies blood in stool. Denies vomiting, diarrhea, fevers, urinary symptoms.

## 2022-12-03 NOTE — ED PROVIDER NOTES
History     Chief Complaint:  Abdominal Pain     The history is provided by the patient.      Colleen Connelly is a 62 year old female who presents with two weeks of epigastric pain. She notes that her pain is constant and nothing makes it better or worse. Patient states that she has been taking Pepcid and Milk of Magnesia for the pain, which has not offered any relief. She did try Prilosec for her pain at the start of her symptoms and says this briefly improved her pain, but she only took this medication for a few days. She has a history of ulcers and notes that her current pain feels similar. No recent fevers, nausea, vomiting, or diarrhea. History of cholecystectomy. Patient does not regularly drink alcohol or take Ibuprofen on a daily basis.     Review of Systems   Constitutional: Negative for fever.   Gastrointestinal: Positive for abdominal pain. Negative for diarrhea, nausea and vomiting.   All other systems reviewed and are negative.    Allergies:  No Known Allergies    Medications:  Paxil  Imitrex  Celebrex  Neurontin  Belbuca  Lunesta     Past Medical History:     Anxiety   Migraines  Osteoarthritis of knee  Temporomandibular joint disorder  Vitamin D deficiency    Depression  Varicella  Ulcer - gastric     Past Surgical History:    Abdominoplasty  Cholecystectomy   [place] teeth extraction    Liposuction     Family History:    Mother: Alzheimer's disease, hyperlipidemia    Social History:  The patient presents to the ED alone  Arrives via private vehicle  PCP: Saray Simpson PA-C, Family Medicine     Physical Exam     Patient Vitals for the past 24 hrs:   BP Temp Pulse Resp SpO2   12/02/22 1738 132/96 98.6  F (37  C) 69 18 95 %     Physical Exam  Nursing note and vitals reviewed.  Constitutional: Cooperative.   HENT:   Mouth/Throat: Mucous membranes are normal.   Cardiovascular: Normal rate, regular rhythm and normal heart sounds.    Pulmonary/Chest: Effort normal and breath sounds normal. No  respiratory distress. No wheezes.   Abdominal: Soft. Normal appearance and bowel sounds are normal. No distension. Mid epigastric tenderness. There is no rigidity and no guarding.   Neurological: Alert. Oriented x4.   Skin: Skin is warm and dry.  Psychiatric: Normal mood and affect.     Emergency Department Course     Imaging:  CT Abdomen Pelvis w Contrast   Final Result   IMPRESSION:    No acute process in the abdomen or pelvis.      Report per radiology    Laboratory:  Labs Ordered and Resulted from Time of ED Arrival to Time of ED Departure   ROUTINE UA WITH MICROSCOPIC REFLEX TO CULTURE - Abnormal       Result Value    Color Urine Light Yellow      Appearance Urine Clear      Glucose Urine Negative      Bilirubin Urine Negative      Ketones Urine Negative      Specific Gravity Urine 1.017      Blood Urine Negative      pH Urine 6.0      Protein Albumin Urine Negative      Urobilinogen Urine Normal      Nitrite Urine Negative      Leukocyte Esterase Urine Large (*)     Bacteria Urine Few (*)     Mucus Urine Present (*)     RBC Urine 5 (*)     WBC Urine 3      Squamous Epithelials Urine 5 (*)    COMPREHENSIVE METABOLIC PANEL - Normal    Sodium 140      Potassium 4.2      Chloride 100      Carbon Dioxide (CO2) 28      Anion Gap 12      Urea Nitrogen 17.6      Creatinine 0.76      Calcium 9.5      Glucose 93      Alkaline Phosphatase 95      AST 19      ALT 16      Protein Total 7.4      Albumin 4.4      Bilirubin Total 0.3      GFR Estimate 89     LIPASE - Normal    Lipase 22     CBC WITH PLATELETS AND DIFFERENTIAL    WBC Count 6.3      RBC Count 4.37      Hemoglobin 12.3      Hematocrit 39.0      MCV 89      MCH 28.1      MCHC 31.5      RDW 14.1      Platelet Count 279      % Neutrophils 65      % Lymphocytes 20      % Monocytes 7      % Eosinophils 7      % Basophils 1      % Immature Granulocytes 0      NRBCs per 100 WBC 0      Absolute Neutrophils 4.1      Absolute Lymphocytes 1.3      Absolute Monocytes 0.4       Absolute Eosinophils 0.5      Absolute Basophils 0.0      Absolute Immature Granulocytes 0.0      Absolute NRBCs 0.0         Reviewed:  I reviewed nursing notes, vitals, past medical history and Care Everywhere    Assessments:  2316 I obtained history and examined the patient as noted above. I also explained findings at this time.     Interventions:  2205 Maalox  30mL  PO  2206 Pepcid  20 mg  PO  2206 NS  1L  IV    Disposition:  The patient was discharged to home.     Impression & Plan     Medical Decision Making:   This is a 62 year old female who presented to the ED today for evaluation of upper abdominal pain. Work up above ordered in triage prior to my evaluation due to ED crowding.  Differential diagnosis included gastric reflux, cholelithiasis, cholecystitis, gastritis, peptic ulcer disease, gastroenteritis, pancreatitis, ACS, amongst others. Laboratory analysis here today yielded no acute abnormalities. Imaging also indicated no pathology. Interventions here in the ED included Maalox, Pepcid, and fluids. It is my suspicion that the patient's symptoms are due to underlying gastric reflux, gastritis, or peptic ulcer disease. They were initiated on a PPI as this has provided relief in the past. Gastroenterology referral was placed for EGD. The patient was also sent home with a stool culture (H pylori) for further evaluation of symptoms. They should follow-up with their primary care providerfor recheck of their symptoms. The patient was told to avoid anti-inflammatory medications such as ibuprofen to reduce gastric upset. They were also advised to avoid eating before bed, consuming smaller more frequent meals, and avoid spicy/acidic foods. They will return to the ED for worsening abdominal pain, uncontrolled nausea/vomiting, or if new concerns arise. All questions were answered prior to the patient's discharge. She was in agreement with the plan stated above.       Diagnosis:    ICD-10-CM   1. Abdominal pain,  epigastric  R10.13           Discharge Medications:  Discharge Medication List as of 12/2/2022 11:28 PM      START taking these medications    Details   pantoprazole (PROTONIX) 40 MG EC tablet Take 1 tablet (40 mg) by mouth daily for 30 doses, Disp-30 tablet, R-0, Local Print      sucralfate (CARAFATE) 1 GM/10ML suspension Take 10 mLs (1 g) by mouth 4 times daily as needed (Epigastric pain), Disp-420 mL, R-0, Local Print             Scribe Disclosure:  I, Renita Franco, am serving as a scribe at 11:19 PM on 12/2/2022 to document services personally performed by Bhupinder Hendrickson MD based on my observations and the provider's statements to me.            Bhupinder Hendrickson MD  12/03/22 0016

## 2022-12-03 NOTE — ED PROVIDER NOTES
Rapid Assessment Note    History:   Colleen Connelly is a 62 year old female who presents with constant and worsening mid abdominal pain for 1.5 weeks. She getts abdominal pain intermittently and she had taken omeprazole for 14 days a few weeks and the pain got better but then came back. This pain feels like when she had an ulcer in the past. She has some constipation too. She denies nausea or vomiting. She has seen spots of blood in her stool.      Exam:   General:  Alert, interactive  Cardiovascular:  Well perfused  Lungs:  No respiratory distress, no accessory muscle use  Neuro:  Moving all 4 extremities  Skin:  Warm, dry  Psych:  Normal affect  ***    Plan of Care:   I evaluated the patient and developed an initial plan of care. I discussed this plan and explained that I, or one of my partners, would be returning to complete the evaluation.         I, Eliu Thompson, am serving as a scribe to document services personally performed by Yelitza Child MD, based on my observations and the provider's statements to me.    12/2/2022  [employer]    Portions of this medical record were completed by a scribe. UPON MY REVIEW AND AUTHENTICATION BY ELECTRONIC SIGNATURE, this confirms (a) I performed the applicable clinical services, and (b) the record is accurate.

## 2022-12-04 LAB — BACTERIA UR CULT: NORMAL

## 2022-12-30 ENCOUNTER — HOSPITAL ENCOUNTER (EMERGENCY)
Facility: CLINIC | Age: 62
Discharge: HOME | End: 2022-12-30
Admitting: EMERGENCY MEDICINE
Payer: COMMERCIAL

## 2022-12-30 VITALS
OXYGEN SATURATION: 98 % | HEART RATE: 77 BPM | RESPIRATION RATE: 20 BRPM | TEMPERATURE: 97.9 F | DIASTOLIC BLOOD PRESSURE: 82 MMHG | SYSTOLIC BLOOD PRESSURE: 137 MMHG

## 2022-12-30 PROCEDURE — 999N000104 HC STATISTIC NO CHARGE

## 2022-12-30 PROCEDURE — 93005 ELECTROCARDIOGRAM TRACING: CPT

## 2022-12-30 ASSESSMENT — ACTIVITIES OF DAILY LIVING (ADL)
ADLS_ACUITY_SCORE: 33
ADLS_ACUITY_SCORE: 33

## 2022-12-30 NOTE — ED TRIAGE NOTES
Pt arrives with c/o nausea, dizziness, and anxiety for 1 week. Pt reports this hasn't happened before. Pt denies chest pain or SOB. Pt reports she took Imitrex 1 hour PTA. Pt reports increased frequency of headaches recently.      Triage Assessment     Row Name 12/30/22 0754       Triage Assessment (Adult)    Airway WDL WDL       Respiratory WDL    Respiratory WDL WDL       Skin Circulation/Temperature WDL    Skin Circulation/Temperature WDL WDL       Cardiac WDL    Cardiac WDL WDL       Peripheral/Neurovascular WDL    Peripheral Neurovascular WDL WDL       Cognitive/Neuro/Behavioral WDL    Cognitive/Neuro/Behavioral WDL WDL

## 2023-01-02 LAB
ATRIAL RATE - MUSE: 64 BPM
DIASTOLIC BLOOD PRESSURE - MUSE: NORMAL MMHG
INTERPRETATION ECG - MUSE: NORMAL
P AXIS - MUSE: 70 DEGREES
PR INTERVAL - MUSE: 176 MS
QRS DURATION - MUSE: 78 MS
QT - MUSE: 410 MS
QTC - MUSE: 422 MS
R AXIS - MUSE: -13 DEGREES
SYSTOLIC BLOOD PRESSURE - MUSE: NORMAL MMHG
T AXIS - MUSE: -6 DEGREES
VENTRICULAR RATE- MUSE: 64 BPM

## 2023-06-26 ENCOUNTER — LAB REQUISITION (OUTPATIENT)
Dept: LAB | Facility: CLINIC | Age: 63
End: 2023-06-26
Payer: COMMERCIAL

## 2023-06-26 DIAGNOSIS — R76.12 NONSPECIFIC REACTION TO CELL MEDIATED IMMUNITY MEASUREMENT OF GAMMA INTERFERON ANTIGEN RESPONSE WITHOUT ACTIVE TUBERCULOSIS: ICD-10-CM

## 2023-06-26 PROBLEM — F51.04 CHRONIC INSOMNIA: Status: ACTIVE | Noted: 2023-05-23

## 2023-06-26 PROBLEM — D64.9 NORMOCYTIC ANEMIA: Status: ACTIVE | Noted: 2023-06-24

## 2023-06-26 PROBLEM — Q66.6 PES PLANOVALGUS: Status: ACTIVE | Noted: 2023-05-09

## 2023-06-26 PROBLEM — K21.9 GERD (GASTROESOPHAGEAL REFLUX DISEASE): Status: ACTIVE | Noted: 2023-06-21

## 2023-06-26 PROBLEM — M24.573 EQUINUS CONTRACTURE OF ANKLE: Status: ACTIVE | Noted: 2023-05-09

## 2023-06-26 PROBLEM — F41.8 ANXIETY WITH DEPRESSION: Status: ACTIVE | Noted: 2023-06-21

## 2023-06-26 PROBLEM — G93.40 ACUTE ENCEPHALOPATHY: Status: ACTIVE | Noted: 2023-06-23

## 2023-06-26 PROBLEM — M25.579 ARTHRALGIA OF FOOT: Status: ACTIVE | Noted: 2023-05-09

## 2023-06-26 PROBLEM — G47.33 OSA (OBSTRUCTIVE SLEEP APNEA): Status: ACTIVE | Noted: 2023-06-21

## 2023-06-26 RX ORDER — HYDROMORPHONE HYDROCHLORIDE 4 MG/1
4 TABLET ORAL EVERY 4 HOURS PRN
COMMUNITY
Start: 2023-06-26 | End: 2023-06-26

## 2023-06-26 RX ORDER — CLONIDINE HYDROCHLORIDE 0.1 MG/1
0.1 TABLET ORAL 2 TIMES DAILY PRN
COMMUNITY
Start: 2023-05-31

## 2023-06-26 RX ORDER — ACETAMINOPHEN 325 MG/1
650 TABLET ORAL EVERY 6 HOURS PRN
COMMUNITY
Start: 2023-06-26

## 2023-06-26 RX ORDER — ESZOPICLONE 2 MG/1
2 TABLET, FILM COATED ORAL AT BEDTIME
Qty: 30 TABLET | Refills: 0 | Status: SHIPPED | OUTPATIENT
Start: 2023-06-26 | End: 2023-06-27

## 2023-06-26 RX ORDER — HYDROMORPHONE HYDROCHLORIDE 4 MG/1
4 TABLET ORAL EVERY 4 HOURS PRN
Qty: 30 TABLET | Refills: 0 | Status: SHIPPED | OUTPATIENT
Start: 2023-06-26 | End: 2023-06-27

## 2023-06-26 RX ORDER — MULTIPLE VITAMINS W/ MINERALS TAB 9MG-400MCG
1 TAB ORAL DAILY
COMMUNITY

## 2023-06-26 RX ORDER — SUMATRIPTAN 100 MG/1
100 TABLET, FILM COATED ORAL 2 TIMES DAILY PRN
Qty: 30 TABLET | Refills: 0 | Status: SHIPPED | OUTPATIENT
Start: 2023-06-26

## 2023-06-26 RX ORDER — PANTOPRAZOLE SODIUM 20 MG/1
40 TABLET, DELAYED RELEASE ORAL DAILY
COMMUNITY

## 2023-06-26 RX ORDER — POLYETHYLENE GLYCOL 3350
1 POWDER (GRAM) MISCELLANEOUS DAILY
COMMUNITY
Start: 2023-06-26

## 2023-06-26 RX ORDER — PAROXETINE 40 MG/1
40 TABLET, FILM COATED ORAL EVERY MORNING
COMMUNITY

## 2023-06-26 RX ORDER — ASPIRIN 81 MG/1
162 TABLET ORAL DAILY
COMMUNITY
Start: 2023-06-24

## 2023-06-26 RX ORDER — CELECOXIB 200 MG/1
200 CAPSULE ORAL 2 TIMES DAILY PRN
COMMUNITY
Start: 2022-12-16

## 2023-06-26 RX ORDER — SENNOSIDES A AND B 8.6 MG/1
8.6 TABLET, FILM COATED ORAL 2 TIMES DAILY
COMMUNITY
Start: 2023-06-26

## 2023-06-26 RX ORDER — ESZOPICLONE 2 MG/1
1 TABLET, FILM COATED ORAL AT BEDTIME
COMMUNITY
Start: 2023-06-21 | End: 2023-06-26

## 2023-06-26 RX ORDER — GABAPENTIN 300 MG/1
600 CAPSULE ORAL 2 TIMES DAILY
COMMUNITY
Start: 2022-12-25

## 2023-06-26 NOTE — PROGRESS NOTES
[facility] GERIATRICS    PRIMARY CARE PROVIDER AND CLINIC:  Saray Simpson, [address] / [place] [address]  Chief Complaint   Patient presents with     Hospital F/U      [place] Medical Record Number:  [id number]  Place of Service where encounter took place:  [facility] ([facility]) [[id number]]    Colleen Connelly  is a 63 year old  (1960), admitted to the above facility from  [facility]. Hospital stay 6/21/23 through 6/26/23..   HPI:    63 y.o. female with a history of migraine, insomnia, anxiety with depression, GERD, BONNIE, chronic low back pain who is seen at request of Dr. Sinclair following Procedure(s) (LRB): Gastrocnemius slide right leg; Subtalar joint arthrodesis right foot; 1st tarsometatarsal joint arthrodesis right foot, calcaneal osteotomy right foot (Right) re: med mgmt.     The primary encounter diagnosis was Physical deconditioning. Diagnoses of Generalized muscle weakness, Chronic pain syndrome, S/P foot surgery, Depression, unspecified depression type, Anxiety, Insomnia, unspecified type, Gastroesophageal reflux disease with esophagitis, unspecified whether hemorrhage, Slow transit constipation, BONNIE (obstructive sleep apnea), Migraine without status migrainosus, not intractable, unspecified migraine type, Urinary frequency, Urinary retention, Hx of delirium, Pes planovalgus, and Thrush were also pertinent to this visit.    Met with patient who denies any chest pain, palpitations, shortness of breath, ARIAS, lightheadedness, dizziness, or cough. History of using oxygen at night during hospitalization but has not needed for the past 3 days per her report. Denies any abdominal discomfort. Denies N&V. Denies dysuria or frequency- she does have some history with retention S/T constipation. Reports constipation present with LBM 3 days ago. Appetite good. Sleeping well. Pain currently stable with current regimen. She is followed by pain clinic as well with last visit within  "this month prior to her surgery. Follow up surgery appt scheduled for Friday 6/30/23. Nursing denies any acute concerns. Mood stable. Participating well with therapies. She has several stairs at home and will need to work on this during therapy sessions.     BP Readings from Last 3 Encounters:   06/27/23 118/75   12/30/22 137/82   12/02/22 (!) 132/96     Wt Readings from Last 5 Encounters:   06/27/23 79.4 kg (175 lb)   03/18/20 79.4 kg (175 lb)   03/15/17 81.6 kg (180 lb)   08/10/14 81.6 kg (180 lb)     CODE STATUS/ADVANCE DIRECTIVES DISCUSSION:  Full Code  CPR/Full code   ALLERGIES:   Allergies   Allergen Reactions     Cephalexin Other (See Comments)     Per pt, keflex \"makes [her] feel goofy\" and \"just doesn't work.\" Stated she will not take.      Diphenhydramine      Other reaction(s): Tremors  Per pt, benadryl gives her tremors and she does not want it     Oxycodone Nausea and Vomiting      PAST MEDICAL HISTORY:   Past Medical History:   Diagnosis Date     Anxiety      Classic migraine       PAST SURGICAL HISTORY:   has a past surgical history that includes Cholecystectomy; Abdominoplasty; and [place] teeth extraction.  FAMILY HISTORY: family history is not on file.  SOCIAL HISTORY:   reports that she has never smoked. She does not have any smokeless tobacco history on file. She reports current alcohol use. She reports that she does not use drugs.  Patient's living condition: lives alone    Post Discharge Medication Reconciliation Status:   MED REC REQUIRED  Post Medication Reconciliation Status:  Discharge medications reconciled and changed, see notes/orders         Current Outpatient Medications   Medication Sig     acetaminophen (TYLENOL) 325 MG tablet Take 650 mg by mouth every 6 hours as needed     aspirin 81 MG EC tablet Take 162 mg by mouth daily     Buprenorphine HCl (BELBUCA) 450 MCG FILM buccal film Place 1 Film (450 mcg) inside cheek every 12 hours     celecoxib (CELEBREX) 200 MG capsule Take 200 mg by " "mouth 2 times daily as needed     cloNIDine (CATAPRES) 0.1 MG tablet Take 0.1 mg by mouth 2 times daily as needed     eszopiclone (LUNESTA) 2 MG tablet Take 1 tablet (2 mg) by mouth At Bedtime     gabapentin (NEURONTIN) 300 MG capsule Take 600 mg by mouth 2 times daily     HYDROmorphone (DILAUDID) 4 MG tablet Take 1 tablet (4 mg) by mouth every 4 hours as needed for severe pain     multivitamin w/minerals (THERA-VIT-M) tablet Take 1 tablet by mouth daily     nystatin (MYCOSTATIN) 030982 UNIT/ML suspension Take 10 mLs (1,000,000 Units) by mouth 4 times daily for 14 days     pantoprazole (PROTONIX) 20 MG EC tablet Take 40 mg by mouth daily     PARoxetine (PAXIL) 40 MG tablet Take 40 mg by mouth every morning     polyethylene glycol 3350 POWD Take 1 packet by mouth daily     senna (SENOKOT) 8.6 MG tablet Take 8.6 mg by mouth 2 times daily     SUMAtriptan (IMITREX) 100 MG tablet Take 1 tablet (100 mg) by mouth 2 times daily as needed for migraine     No current facility-administered medications for this visit.       ROS:  10 point ROS of systems including Constitutional, Eyes, Respiratory, Cardiovascular, Gastroenterology, Genitourinary, Integumentary, Musculoskeletal, Psychiatric were all negative except for pertinent positives noted in my HPI.    Vitals:  /75   Pulse 53   Temp 98.7  F (37.1  C)   Resp 14   Ht 1.702 m (5' 7\")   Wt 79.4 kg (175 lb)   SpO2 97%   BMI 27.41 kg/m    Exam:  GENERAL APPEARANCE:  Alert, in no distress, oriented, cooperative  ENT:  Mouth and posterior oropharynx normal, moist mucous membranes, normal hearing acuity  EYES:  EOM, conjunctivae, lids, pupils and irises normal  RESP:  respiratory effort and palpation of chest normal, lungs clear to auscultation , no respiratory distress  CV:  Palpation and auscultation of heart done , regular rate and rhythm, no murmur, rub, or gallop, RLE dressing C/D/I. CMS+ to toes. Bruising present to toes along with + sensation.   ABDOMEN:  normal " bowel sounds, soft, nontender, no hepatosplenomegaly or other masses, no guarding or rebound  M/S:   PTA seldomly ambulates with cane.   SKIN:  Inspection of skin and subcutaneous tissue baseline, Palpation of skin and subcutaneous tissue baseline  NEURO:   Cranial nerves 2-12 are normal tested and grossly at patient's baseline, no purposeful movement in upper and lower extremities  PSYCH:  oriented X 3, normal insight, judgement and memory, affect and mood normal    Lab/Diagnostic data:    Most Recent 3 CBC's:  Recent Labs   Lab Test 12/02/22  2158 03/15/17  1406 05/29/16  0910   WBC 6.3 5.5 5.5   HGB 12.3 11.9 13.5   MCV 89 90 88    263 319     Most Recent 3 BMP's:  Recent Labs   Lab Test 12/02/22  2158 03/15/17  1406 05/29/16  0910    142 142   POTASSIUM 4.2 3.4 4.3   CHLORIDE 100 106 108   CO2 28 28 28   BUN 17.6 14 11   CR 0.76 0.84 0.79   ANIONGAP 12 8 6   TRISHA 9.5 8.5 8.9   GLC 93 77 76     Most Recent 2 LFT's:  Recent Labs   Lab Test 12/02/22  2158 03/15/17  1406   AST 19 17   ALT 16 22   ALKPHOS 95 70   BILITOTAL 0.3 0.4     Most Recent Urinalysis:  Recent Labs   Lab Test 12/02/22  1813   COLOR Light Yellow   APPEARANCE Clear   URINEGLC Negative   URINEBILI Negative   URINEKETONE Negative   SG 1.017   UBLD Negative   URINEPH 6.0   PROTEIN Negative   NITRITE Negative   LEUKEST Large*   RBCU 5*   WBCU 3     Most Recent Anemia Panel:  Recent Labs   Lab Test 12/02/22 2158   WBC 6.3   HGB 12.3   HCT 39.0   MCV 89          ASSESSMENT/PLAN:    (R53.81) Physical deconditioning  (primary encounter diagnosis)  (M62.81) Generalized muscle weakness  Comment: Acute on chronic. S/T below diagnosis  Plan:   -Continue Physical therapy and Occupational therapy as directed  -SW to remain involved for safe discharge planning needs    (G89.4) Chronic pain syndrome  (Z98.890) S/P foot surgery  (Q66.6) Pes planovalgus  Comment: [facility] who was seen  at request of Dr. Sinclair following Procedure(s) (LRB): Gastrocnemius slide right leg; Subtalar joint arthrodesis right foot; 1st tarsometatarsal joint arthrodesis right foot, calcaneal osteotomy right foot (Right) re: med mgmt. She follows with the pain clinic for her chronic knee/low back pain and is on Belbuca for that. States that she is on clonidine for her pain.  Plan:   -Monitor pain complaints  -Follow up with podiatry as directed. Scheduled for 6/30/23 at 3pm with Patrice Gamino  -Follow up with wound clinic post TCU  -Continue belbuca BID  -Clonidine BID PRN  -Tylenol PRN  -Continue NWB to RLE. Currently in splint on RLE.   -Celebrex BID PRN--discontinue if non use  -Gabapentin 600mg BID scheduled. HOLD if oversedated.   -Aspirin 162mg daily  -Dilaudid 4mg every 4 hours PRN  -CMP, CBC and vitamin D due 6/29/23    (F32.A) Depression, unspecified depression type  (F41.9) Anxiety  (G47.00) Insomnia, unspecified type  Comment: Chronic. Poor sleep complaints. Reports trazodone not effective. PTA lunesta.   Plan:   -Monitor pain complaints  -Monitor for worsening s/sx of concerns  -Monitor for changes in mobility, eating and sleeping patterns  -Continue lunesta 2mg daily.   -Continue paxil 40mg daily  -CMP, CBC and vitamin D due 6/29/23    (K21.00) Gastroesophageal reflux disease with esophagitis, unspecified whether hemorrhage  (K59.01) Slow transit constipation  Comment: Chronic. S/T narcotics. LBM 3 days ago. Good relief after supp last time during hospitalization.   Plan:   -Monitor BM patterns  -Continue to monitor for worsening s/sx of concerns  -Continue senna BID  -Miralax daily  -Pantoprazole 20mg daily  -Add Bisacodyl 10mg daily PRN--give dose today as LBM 3 days ago.   -CMP, CBC and vitamin D due 6/29/23    (G47.33) BONNIE (obstructive sleep apnea)  Comment: Acute on chronic. Nocturnal hypoxia during hospital stay. Low grade fevers (100F) also noted, on 2L while sleeping for couple nights while  hospitalized. Has not needed for the past 3 days. Likely untreated obstructive sleep apnea contributing and atelectasis.   Plan:   -Monitor for worsening s/sx of concerns  -Consider chest radiograph, WBC if hypoxia worsens or temperature rises  -Encourage incentive spirometry every 2 hours while awake.   -Monitor respiratory status  -CMP, CBC and vitamin D due 6/29/23  -Would recommend outpatient sleep clinic follow up post TCU    (G43.909) Migraine without status migrainosus, not intractable, unspecified migraine type  (Z87.898) History of delirium  Comment: Acute on chronic. Struggling with migraines postoperatively, improved by 6/23/2023. Also with some delirium on 6/22/23 at night, may have been related to taking Ambien (on hold).   Plan:   -Monitor for pain complaints  -Imitrex BID PRN  -Monitor for worsening s/sx of concerns  -Monitor for worsening confusion/delirium risks  -CMP, CBC and vitamin D due 6/29/23    (R35.0) Urinary frequency  (R33.9) Urinary retention  Comment: Acute on chronic. Improving. Has been a problem intermittently since surgery, required some straight caths initially. Suspect constipation contributing. UA negative. Bladder scan protocol. Patient requesting medications for bladder pressure, discussed medications will likely cause more unwanted side effects, should work on bowels for now.   Plan:   -Monitor urinary status  -Monitor for signs of infection risks  -Monitor bowels  -CMP, CBC and vitamin D due 6/29/23    (B37.0) Thrush  Comment: Acute. White patches noted to tongue today with irritation noted. Dry mouth. Suspect due to polypharmacy risks  Plan:  -Nystatin QID swish and swallow x 14 days  -Monitor for worsening s/sx of concerns.     Electronically signed by:  Belen Borjas DNP, APRN

## 2023-06-27 ENCOUNTER — LAB REQUISITION (OUTPATIENT)
Dept: LAB | Facility: CLINIC | Age: 63
End: 2023-06-27
Payer: COMMERCIAL

## 2023-06-27 ENCOUNTER — TRANSITIONAL CARE UNIT VISIT (OUTPATIENT)
Dept: GERIATRICS | Facility: CLINIC | Age: 63
End: 2023-06-27
Payer: COMMERCIAL

## 2023-06-27 VITALS
TEMPERATURE: 98.7 F | WEIGHT: 175 LBS | HEIGHT: 67 IN | RESPIRATION RATE: 14 BRPM | OXYGEN SATURATION: 97 % | HEART RATE: 53 BPM | DIASTOLIC BLOOD PRESSURE: 75 MMHG | BODY MASS INDEX: 27.47 KG/M2 | SYSTOLIC BLOOD PRESSURE: 118 MMHG

## 2023-06-27 DIAGNOSIS — F41.9 ANXIETY: ICD-10-CM

## 2023-06-27 DIAGNOSIS — R33.9 URINARY RETENTION: ICD-10-CM

## 2023-06-27 DIAGNOSIS — D64.9 ANEMIA, UNSPECIFIED: ICD-10-CM

## 2023-06-27 DIAGNOSIS — B37.0 THRUSH: ICD-10-CM

## 2023-06-27 DIAGNOSIS — G43.909 MIGRAINE WITHOUT STATUS MIGRAINOSUS, NOT INTRACTABLE, UNSPECIFIED MIGRAINE TYPE: ICD-10-CM

## 2023-06-27 DIAGNOSIS — G47.33 OSA (OBSTRUCTIVE SLEEP APNEA): ICD-10-CM

## 2023-06-27 DIAGNOSIS — M62.81 GENERALIZED MUSCLE WEAKNESS: ICD-10-CM

## 2023-06-27 DIAGNOSIS — G89.4 CHRONIC PAIN SYNDROME: ICD-10-CM

## 2023-06-27 DIAGNOSIS — N18.9 CHRONIC KIDNEY DISEASE, UNSPECIFIED: ICD-10-CM

## 2023-06-27 DIAGNOSIS — Z87.898 HX OF DELIRIUM: ICD-10-CM

## 2023-06-27 DIAGNOSIS — G47.00 INSOMNIA, UNSPECIFIED TYPE: ICD-10-CM

## 2023-06-27 DIAGNOSIS — Z98.890 S/P FOOT SURGERY: ICD-10-CM

## 2023-06-27 DIAGNOSIS — K21.00 GASTROESOPHAGEAL REFLUX DISEASE WITH ESOPHAGITIS, UNSPECIFIED WHETHER HEMORRHAGE: ICD-10-CM

## 2023-06-27 DIAGNOSIS — Q66.6 PES PLANOVALGUS: ICD-10-CM

## 2023-06-27 DIAGNOSIS — I10 ESSENTIAL (PRIMARY) HYPERTENSION: ICD-10-CM

## 2023-06-27 DIAGNOSIS — K59.01 SLOW TRANSIT CONSTIPATION: ICD-10-CM

## 2023-06-27 DIAGNOSIS — R53.81 PHYSICAL DECONDITIONING: Primary | ICD-10-CM

## 2023-06-27 DIAGNOSIS — R35.0 URINARY FREQUENCY: ICD-10-CM

## 2023-06-27 DIAGNOSIS — F32.A DEPRESSION, UNSPECIFIED DEPRESSION TYPE: ICD-10-CM

## 2023-06-27 PROCEDURE — 99309 SBSQ NF CARE MODERATE MDM 30: CPT | Performed by: NURSE PRACTITIONER

## 2023-06-27 PROCEDURE — 36415 COLL VENOUS BLD VENIPUNCTURE: CPT | Mod: ORL | Performed by: INTERNAL MEDICINE

## 2023-06-27 PROCEDURE — P9604 ONE-WAY ALLOW PRORATED TRIP: HCPCS | Mod: ORL | Performed by: INTERNAL MEDICINE

## 2023-06-27 PROCEDURE — 86481 TB AG RESPONSE T-CELL SUSP: CPT | Mod: ORL | Performed by: INTERNAL MEDICINE

## 2023-06-27 RX ORDER — NYSTATIN 100000/ML
1000000 SUSPENSION, ORAL (FINAL DOSE FORM) ORAL 4 TIMES DAILY
Qty: 560 ML | Refills: 0 | Status: SHIPPED | OUTPATIENT
Start: 2023-06-27 | End: 2023-07-11

## 2023-06-27 NOTE — LETTER
6/27/2023        RE: Colleen Connelly  [address]  [place] [address]        [facility] GERIATRICS    PRIMARY CARE PROVIDER AND CLINIC:  Saray Simpson, [address] / [place] [address]  Chief Complaint   Patient presents with     Hospital F/U      [place] Medical Record Number:  [id number]  Place of Service where encounter took place:  [facility] ([facility]) [[id number]]    Colleen Connelly  is a 63 year old  (1960), admitted to the above facility from  [facility]. Hospital stay 6/21/23 through 6/26/23..   HPI:    63 y.o. female with a history of migraine, insomnia, anxiety with depression, GERD, BONNIE, chronic low back pain who is seen at request of Dr. Sinclair following Procedure(s) (LRB): Gastrocnemius slide right leg; Subtalar joint arthrodesis right foot; 1st tarsometatarsal joint arthrodesis right foot, calcaneal osteotomy right foot (Right) re: med mgmt.     The primary encounter diagnosis was Physical deconditioning. Diagnoses of Generalized muscle weakness, Chronic pain syndrome, S/P foot surgery, Depression, unspecified depression type, Anxiety, Insomnia, unspecified type, Gastroesophageal reflux disease with esophagitis, unspecified whether hemorrhage, Slow transit constipation, BONNIE (obstructive sleep apnea), Migraine without status migrainosus, not intractable, unspecified migraine type, Urinary frequency, Urinary retention, Hx of delirium, Pes planovalgus, and Thrush were also pertinent to this visit.    Met with patient who denies any chest pain, palpitations, shortness of breath, ARIAS, lightheadedness, dizziness, or cough. History of using oxygen at night during hospitalization but has not needed for the past 3 days per her report. Denies any abdominal discomfort. Denies N&V. Denies dysuria or frequency- she does have some history with retention S/T constipation. Reports constipation present with LBM 3 days ago. Appetite good. Sleeping well. Pain currently stable  "with current regimen. She is followed by pain clinic as well with last visit within this month prior to her surgery. Follow up surgery appt scheduled for Friday 6/30/23. Nursing denies any acute concerns. Mood stable. Participating well with therapies. She has several stairs at home and will need to work on this during therapy sessions.     BP Readings from Last 3 Encounters:   06/27/23 118/75   12/30/22 137/82   12/02/22 (!) 132/96     Wt Readings from Last 5 Encounters:   06/27/23 79.4 kg (175 lb)   03/18/20 79.4 kg (175 lb)   03/15/17 81.6 kg (180 lb)   08/10/14 81.6 kg (180 lb)     CODE STATUS/ADVANCE DIRECTIVES DISCUSSION:  Full Code  CPR/Full code   ALLERGIES:   Allergies   Allergen Reactions     Cephalexin Other (See Comments)     Per pt, keflex \"makes [her] feel goofy\" and \"just doesn't work.\" Stated she will not take.      Diphenhydramine      Other reaction(s): Tremors  Per pt, benadryl gives her tremors and she does not want it     Oxycodone Nausea and Vomiting      PAST MEDICAL HISTORY:   Past Medical History:   Diagnosis Date     Anxiety      Classic migraine       PAST SURGICAL HISTORY:   has a past surgical history that includes Cholecystectomy; Abdominoplasty; and [place] teeth extraction.  FAMILY HISTORY: family history is not on file.  SOCIAL HISTORY:   reports that she has never smoked. She does not have any smokeless tobacco history on file. She reports current alcohol use. She reports that she does not use drugs.  Patient's living condition: lives alone    Post Discharge Medication Reconciliation Status:   MED REC REQUIRED  Post Medication Reconciliation Status:  Discharge medications reconciled and changed, see notes/orders         Current Outpatient Medications   Medication Sig     acetaminophen (TYLENOL) 325 MG tablet Take 650 mg by mouth every 6 hours as needed     aspirin 81 MG EC tablet Take 162 mg by mouth daily     Buprenorphine HCl (BELBUCA) 450 MCG FILM buccal film Place 1 Film (450 " "mcg) inside cheek every 12 hours     celecoxib (CELEBREX) 200 MG capsule Take 200 mg by mouth 2 times daily as needed     cloNIDine (CATAPRES) 0.1 MG tablet Take 0.1 mg by mouth 2 times daily as needed     eszopiclone (LUNESTA) 2 MG tablet Take 1 tablet (2 mg) by mouth At Bedtime     gabapentin (NEURONTIN) 300 MG capsule Take 600 mg by mouth 2 times daily     HYDROmorphone (DILAUDID) 4 MG tablet Take 1 tablet (4 mg) by mouth every 4 hours as needed for severe pain     multivitamin w/minerals (THERA-VIT-M) tablet Take 1 tablet by mouth daily     nystatin (MYCOSTATIN) 164167 UNIT/ML suspension Take 10 mLs (1,000,000 Units) by mouth 4 times daily for 14 days     pantoprazole (PROTONIX) 20 MG EC tablet Take 40 mg by mouth daily     PARoxetine (PAXIL) 40 MG tablet Take 40 mg by mouth every morning     polyethylene glycol 3350 POWD Take 1 packet by mouth daily     senna (SENOKOT) 8.6 MG tablet Take 8.6 mg by mouth 2 times daily     SUMAtriptan (IMITREX) 100 MG tablet Take 1 tablet (100 mg) by mouth 2 times daily as needed for migraine     No current facility-administered medications for this visit.       ROS:  10 point ROS of systems including Constitutional, Eyes, Respiratory, Cardiovascular, Gastroenterology, Genitourinary, Integumentary, Musculoskeletal, Psychiatric were all negative except for pertinent positives noted in my HPI.    Vitals:  /75   Pulse 53   Temp 98.7  F (37.1  C)   Resp 14   Ht 1.702 m (5' 7\")   Wt 79.4 kg (175 lb)   SpO2 97%   BMI 27.41 kg/m    Exam:  GENERAL APPEARANCE:  Alert, in no distress, oriented, cooperative  ENT:  Mouth and posterior oropharynx normal, moist mucous membranes, normal hearing acuity  EYES:  EOM, conjunctivae, lids, pupils and irises normal  RESP:  respiratory effort and palpation of chest normal, lungs clear to auscultation , no respiratory distress  CV:  Palpation and auscultation of heart done , regular rate and rhythm, no murmur, rub, or gallop, RLE dressing " C/D/I. CMS+ to toes. Bruising present to toes along with + sensation.   ABDOMEN:  normal bowel sounds, soft, nontender, no hepatosplenomegaly or other masses, no guarding or rebound  M/S:   PTA seldomly ambulates with cane.   SKIN:  Inspection of skin and subcutaneous tissue baseline, Palpation of skin and subcutaneous tissue baseline  NEURO:   Cranial nerves 2-12 are normal tested and grossly at patient's baseline, no purposeful movement in upper and lower extremities  PSYCH:  oriented X 3, normal insight, judgement and memory, affect and mood normal    Lab/Diagnostic data:    Most Recent 3 CBC's:  Recent Labs   Lab Test 12/02/22  2158 03/15/17  1406 05/29/16  0910   WBC 6.3 5.5 5.5   HGB 12.3 11.9 13.5   MCV 89 90 88    263 319     Most Recent 3 BMP's:  Recent Labs   Lab Test 12/02/22  2158 03/15/17  1406 05/29/16  0910    142 142   POTASSIUM 4.2 3.4 4.3   CHLORIDE 100 106 108   CO2 28 28 28   BUN 17.6 14 11   CR 0.76 0.84 0.79   ANIONGAP 12 8 6   TRISHA 9.5 8.5 8.9   GLC 93 77 76     Most Recent 2 LFT's:  Recent Labs   Lab Test 12/02/22  2158 03/15/17  1406   AST 19 17   ALT 16 22   ALKPHOS 95 70   BILITOTAL 0.3 0.4     Most Recent Urinalysis:  Recent Labs   Lab Test 12/02/22  1813   COLOR Light Yellow   APPEARANCE Clear   URINEGLC Negative   URINEBILI Negative   URINEKETONE Negative   SG 1.017   UBLD Negative   URINEPH 6.0   PROTEIN Negative   NITRITE Negative   LEUKEST Large*   RBCU 5*   WBCU 3     Most Recent Anemia Panel:  Recent Labs   Lab Test 12/02/22 2158   WBC 6.3   HGB 12.3   HCT 39.0   MCV 89          ASSESSMENT/PLAN:    (R53.81) Physical deconditioning  (primary encounter diagnosis)  (M62.81) Generalized muscle weakness  Comment: Acute on chronic. S/T below diagnosis  Plan:   -Continue Physical therapy and Occupational therapy as directed  -SW to remain involved for safe discharge planning needs    (G89.4) Chronic pain syndrome  (Z98.890) S/P foot surgery  (Q66.6) Pes  planovalgus  Comment: Chronic. Follows [facility] clinic- [facility]. chronic low back pain who was seen at request of Dr. Sinclair following Procedure(s) (LRB): Gastrocnemius slide right leg; Subtalar joint arthrodesis right foot; 1st tarsometatarsal joint arthrodesis right foot, calcaneal osteotomy right foot (Right) re: med mgmt. She follows with the pain clinic for her chronic knee/low back pain and is on Belbuca for that. States that she is on clonidine for her pain.  Plan:   -Monitor pain complaints  -Follow up with podiatry as directed. Scheduled for 6/30/23 at 3pm with Patrice Gamino  -Follow up with wound clinic post TCU  -Continue belbuca BID  -Clonidine BID PRN  -Tylenol PRN  -Continue NWB to RLE. Currently in splint on RLE.   -Celebrex BID PRN--discontinue if non use  -Gabapentin 600mg BID scheduled. HOLD if oversedated.   -Aspirin 162mg daily  -Dilaudid 4mg every 4 hours PRN  -CMP, CBC and vitamin D due 6/29/23    (F32.A) Depression, unspecified depression type  (F41.9) Anxiety  (G47.00) Insomnia, unspecified type  Comment: Chronic. Poor sleep complaints. Reports trazodone not effective. PTA lunesta.   Plan:   -Monitor pain complaints  -Monitor for worsening s/sx of concerns  -Monitor for changes in mobility, eating and sleeping patterns  -Continue lunesta 2mg daily.   -Continue paxil 40mg daily  -CMP, CBC and vitamin D due 6/29/23    (K21.00) Gastroesophageal reflux disease with esophagitis, unspecified whether hemorrhage  (K59.01) Slow transit constipation  Comment: Chronic. S/T narcotics. LBM 3 days ago. Good relief after supp last time during hospitalization.   Plan:   -Monitor BM patterns  -Continue to monitor for worsening s/sx of concerns  -Continue senna BID  -Miralax daily  -Pantoprazole 20mg daily  -Add Bisacodyl 10mg daily PRN--give dose today as LBM 3 days ago.   -CMP, CBC and vitamin D due 6/29/23    (G47.33) BONNIE (obstructive sleep apnea)  Comment: Acute on chronic. Nocturnal hypoxia during  hospital stay. Low grade fevers (100F) also noted, on 2L while sleeping for couple nights while hospitalized. Has not needed for the past 3 days. Likely untreated obstructive sleep apnea contributing and atelectasis.   Plan:   -Monitor for worsening s/sx of concerns  -Consider chest radiograph, WBC if hypoxia worsens or temperature rises  -Encourage incentive spirometry every 2 hours while awake.   -Monitor respiratory status  -CMP, CBC and vitamin D due 6/29/23  -Would recommend outpatient sleep clinic follow up post TCU    (G43.909) Migraine without status migrainosus, not intractable, unspecified migraine type  (Z87.898) History of delirium  Comment: Acute on chronic. Struggling with migraines postoperatively, improved by 6/23/2023. Also with some delirium on 6/22/23 at night, may have been related to taking Ambien (on hold).   Plan:   -Monitor for pain complaints  -Imitrex BID PRN  -Monitor for worsening s/sx of concerns  -Monitor for worsening confusion/delirium risks  -CMP, CBC and vitamin D due 6/29/23    (R35.0) Urinary frequency  (R33.9) Urinary retention  Comment: Acute on chronic. Improving. Has been a problem intermittently since surgery, required some straight caths initially. Suspect constipation contributing. UA negative. Bladder scan protocol. Patient requesting medications for bladder pressure, discussed medications will likely cause more unwanted side effects, should work on bowels for now.   Plan:   -Monitor urinary status  -Monitor for signs of infection risks  -Monitor bowels  -CMP, CBC and vitamin D due 6/29/23    (B37.0) Thrush  Comment: Acute. White patches noted to tongue today with irritation noted. Dry mouth. Suspect due to polypharmacy risks  Plan:  -Nystatin QID swish and swallow x 14 days  -Monitor for worsening s/sx of concerns.     Electronically signed by:  Belen Borjas DNP, APRN                        Sincerely,        Belen Borjas, ALBERTO CNP

## 2023-06-28 LAB
QUANTIFERON MITOGEN: 10 IU/ML
QUANTIFERON NIL TUBE: 0.01 IU/ML
QUANTIFERON TB1 TUBE: 0.02 IU/ML
QUANTIFERON TB2 TUBE: 0.02

## 2023-06-29 LAB
ALBUMIN SERPL BCG-MCNC: 3.6 G/DL (ref 3.5–5.2)
ALP SERPL-CCNC: 77 U/L (ref 35–104)
ALT SERPL W P-5'-P-CCNC: 14 U/L (ref 0–50)
ANION GAP SERPL CALCULATED.3IONS-SCNC: 9 MMOL/L (ref 7–15)
AST SERPL W P-5'-P-CCNC: 15 U/L (ref 0–45)
BILIRUB SERPL-MCNC: 0.2 MG/DL
BUN SERPL-MCNC: 14.1 MG/DL (ref 8–23)
CALCIUM SERPL-MCNC: 9.2 MG/DL (ref 8.8–10.2)
CHLORIDE SERPL-SCNC: 101 MMOL/L (ref 98–107)
CREAT SERPL-MCNC: 0.74 MG/DL (ref 0.51–0.95)
DEPRECATED HCO3 PLAS-SCNC: 30 MMOL/L (ref 22–29)
ERYTHROCYTE [DISTWIDTH] IN BLOOD BY AUTOMATED COUNT: 14.2 % (ref 10–15)
GAMMA INTERFERON BACKGROUND BLD IA-ACNC: 0.01 IU/ML
GFR SERPL CREATININE-BSD FRML MDRD: >90 ML/MIN/1.73M2
GLUCOSE SERPL-MCNC: 88 MG/DL (ref 70–99)
HCT VFR BLD AUTO: 32.4 % (ref 35–47)
HGB BLD-MCNC: 10.1 G/DL (ref 11.7–15.7)
M TB IFN-G BLD-IMP: NEGATIVE
M TB IFN-G CD4+ BCKGRND COR BLD-ACNC: 9.99 IU/ML
MCH RBC QN AUTO: 28.1 PG (ref 26.5–33)
MCHC RBC AUTO-ENTMCNC: 31.2 G/DL (ref 31.5–36.5)
MCV RBC AUTO: 90 FL (ref 78–100)
MITOGEN IGNF BCKGRD COR BLD-ACNC: 0.01 IU/ML
MITOGEN IGNF BCKGRD COR BLD-ACNC: 0.01 IU/ML
PLATELET # BLD AUTO: 350 10E3/UL (ref 150–450)
POTASSIUM SERPL-SCNC: 4.1 MMOL/L (ref 3.4–5.3)
PROT SERPL-MCNC: 6.4 G/DL (ref 6.4–8.3)
RBC # BLD AUTO: 3.59 10E6/UL (ref 3.8–5.2)
SODIUM SERPL-SCNC: 140 MMOL/L (ref 136–145)
WBC # BLD AUTO: 6 10E3/UL (ref 4–11)

## 2023-06-29 PROCEDURE — P9604 ONE-WAY ALLOW PRORATED TRIP: HCPCS | Mod: ORL | Performed by: INTERNAL MEDICINE

## 2023-06-29 PROCEDURE — 85027 COMPLETE CBC AUTOMATED: CPT | Mod: ORL | Performed by: INTERNAL MEDICINE

## 2023-06-29 PROCEDURE — 36415 COLL VENOUS BLD VENIPUNCTURE: CPT | Mod: ORL | Performed by: INTERNAL MEDICINE

## 2023-06-29 PROCEDURE — 80053 COMPREHEN METABOLIC PANEL: CPT | Mod: ORL | Performed by: INTERNAL MEDICINE

## 2023-06-29 PROCEDURE — 82306 VITAMIN D 25 HYDROXY: CPT | Mod: ORL | Performed by: INTERNAL MEDICINE

## 2023-06-29 NOTE — PROGRESS NOTES
"Per staff report today 6/29/23: \"Colleen wants to discharge home tomorrow after her appointment. Therapy does not recommend this as she has only walked 10-15 feet. She has 12 stairs; they have not worked on them yet here. According to Colleen, her brother and sister-in-law will be with her for two weeks to help her and her \" doctor\" has already ordered her a wheelchair and walker. What are your thoughts? Do you want us to send you orders or will she be leaving AMA? I explained to her what it means to leave AMA and she was fine with it. Colleen stated her doctor will order her home care.\"  Plan:  -Will sign discharge orders today  -Staff to coordinate home care needs for ongoing support  -Follow up with PCP within 2 weeks  -Follow up with podiatry as directed    "

## 2023-06-30 LAB — DEPRECATED CALCIDIOL+CALCIFEROL SERPL-MC: 18 UG/L (ref 20–75)

## 2024-10-13 ENCOUNTER — HOSPITAL ENCOUNTER (EMERGENCY)
Facility: CLINIC | Age: 64
Discharge: HOME | End: 2024-10-13
Attending: EMERGENCY MEDICINE | Admitting: EMERGENCY MEDICINE
Payer: COMMERCIAL

## 2024-10-13 VITALS
BODY MASS INDEX: 32.45 KG/M2 | HEIGHT: 66 IN | SYSTOLIC BLOOD PRESSURE: 170 MMHG | WEIGHT: 201.94 LBS | OXYGEN SATURATION: 97 % | HEART RATE: 88 BPM | TEMPERATURE: 98.2 F | DIASTOLIC BLOOD PRESSURE: 89 MMHG | RESPIRATION RATE: 16 BRPM

## 2024-10-13 DIAGNOSIS — F41.9 ANXIETY: ICD-10-CM

## 2024-10-13 DIAGNOSIS — G47.00 INSOMNIA, UNSPECIFIED TYPE: ICD-10-CM

## 2024-10-13 PROCEDURE — 99283 EMERGENCY DEPT VISIT LOW MDM: CPT

## 2024-10-13 RX ORDER — ZOLPIDEM TARTRATE 10 MG/1
10 TABLET ORAL
Qty: 2 TABLET | Refills: 0 | Status: SHIPPED | OUTPATIENT
Start: 2024-10-13 | End: 2024-10-15

## 2024-10-13 ASSESSMENT — COLUMBIA-SUICIDE SEVERITY RATING SCALE - C-SSRS
2. HAVE YOU ACTUALLY HAD ANY THOUGHTS OF KILLING YOURSELF IN THE PAST MONTH?: NO
6. HAVE YOU EVER DONE ANYTHING, STARTED TO DO ANYTHING, OR PREPARED TO DO ANYTHING TO END YOUR LIFE?: NO
1. IN THE PAST MONTH, HAVE YOU WISHED YOU WERE DEAD OR WISHED YOU COULD GO TO SLEEP AND NOT WAKE UP?: NO

## 2024-10-13 NOTE — ED TRIAGE NOTES
Pt presents to the ED stating she has been having panic attacks for the last 2 days. Pt states she is under a lot of stress and getting out of a bad relationship. Pt states she saw her PCP this week and he refilled her medications. Pt went to the pharmacy to pick them up and pharmacist told her her sleeping pill could not be refilled until Oct 30th. Pt states not sleeping is making her more anxious.

## 2024-10-13 NOTE — ED PROVIDER NOTES
"  Emergency Department Note      History of Present Illness     Chief Complaint   Anxiety and Medication Refill    HPI   Colleen Connelly is a 64 year old female with a history of anxiety presenting with anxiety and medication refill. The patient saw her PCP 4 days ago to refill her medications but is unable to fill the prescription due to insurance issues. She notes that she is under a lot of stress and is not able to sleep. These past few days, the patient has had 2 panic attacks and no sleep. The patient states that melatonin doesn't work, and she needs Ambien or Paxil.    Independent Historian   None    Review of External Notes   I reviewed recent telephone call to the primary care provider reviewed PDMP.    Past Medical History     Medical History and Problem List   Anxiety   Classic migraine   Perennial allergic rhinitis   Chronic insomnia   Pes planovalgus   Equinus contracture of ankle   Right foot pain   ASCUS of cervix   Acute midline low back pain without sciatica   Osteoarthritis of knee   TMJ   STD   Varicella   Normocytic anemia     Medications   Aspirin 81 mg   Celecoxib   Clonidine   Eszopiclone   Gabapentin   Hydromorphone   Pantoprazole   Paroxetine   Senna   Sumatriptan     Surgical History   Abdominoplasty   Cholecystectomy   Lumbar puncture   [place] teeth extraction   Liposuction     Physical Exam     Patient Vitals for the past 24 hrs:   BP Temp Temp src Pulse Resp SpO2 Height Weight   10/13/24 1805 (!) 170/89 98.2  F (36.8  C) Temporal 88 16 97 % 1.676 m (5' 6\") 91.6 kg (201 lb 15.1 oz)     Physical Exam      CV: ppi, regular   Resp: speaking in full sentences without any resp distress  Skin: warm dry well perfused  Neuro: Alert, no gross motor or sensory deficits,  gait stable      Anxious appearing      Diagnostics     Lab Results   Labs Ordered and Resulted from Time of ED Arrival to Time of ED Departure - No data to display    Imaging   No orders to display       Independent Interpretation "   None    ED Course      Medications Administered   Medications - No data to display    Procedures   Procedures     Discussion of Management   None    ED Course   ED Course as of 10/13/24 1851   Sun Oct 13, 2024   1839 I obtained the history and examined the patient as above.        Additional Documentation  None    Medical Decision Making / Diagnosis     CMS Diagnoses: None    MIPS   None    MDM   Colleen Connelly is a 64 year old female with a history of anxiety and insomnia here with increased anxiety over being unable to fall her Lunesta which is longstanding treatment for insomnia.  No indication for hospitalization or further medical workup.  Discharge home discussed role of the emergency department and cannot provide long-term prescriptions for sleeping medications.  Given 2-day supply of Ambien until she can touch base with her primary care physician regarding long-term prescriptions.    Disposition   The patient was discharged.     Diagnosis     ICD-10-CM    1. Insomnia, unspecified type  G47.00       2. Anxiety  F41.9            Discharge Medications   New Prescriptions    ZOLPIDEM (AMBIEN) 10 MG TABLET    Take 1 tablet (10 mg) by mouth nightly as needed for sleep.         Scribe Disclosure:  I, Phoenix Peterson, am serving as a scribe at 6:18 PM on 10/13/2024 to document services personally performed by Franko Abdalla MD based on my observations and the provider's statements to me.        Franko Abdalla MD  10/14/24 3233

## 2025-06-16 ENCOUNTER — HOSPITAL ENCOUNTER (EMERGENCY)
Facility: CLINIC | Age: 65
Discharge: HOME | End: 2025-06-16
Attending: PHYSICIAN ASSISTANT | Admitting: PHYSICIAN ASSISTANT
Payer: COMMERCIAL

## 2025-06-16 ENCOUNTER — APPOINTMENT (OUTPATIENT)
Dept: CT IMAGING | Facility: CLINIC | Age: 65
End: 2025-06-16
Attending: PHYSICIAN ASSISTANT
Payer: COMMERCIAL

## 2025-06-16 VITALS
HEIGHT: 66 IN | WEIGHT: 211.64 LBS | RESPIRATION RATE: 18 BRPM | BODY MASS INDEX: 34.01 KG/M2 | SYSTOLIC BLOOD PRESSURE: 115 MMHG | DIASTOLIC BLOOD PRESSURE: 73 MMHG | HEART RATE: 74 BPM | TEMPERATURE: 98.2 F | OXYGEN SATURATION: 98 %

## 2025-06-16 DIAGNOSIS — N32.89 BLADDER SPASM: ICD-10-CM

## 2025-06-16 DIAGNOSIS — N17.9 ACUTE KIDNEY INJURY: ICD-10-CM

## 2025-06-16 DIAGNOSIS — S20.20XA CONTUSION OF TRUNK, INITIAL ENCOUNTER: ICD-10-CM

## 2025-06-16 DIAGNOSIS — K52.9 COLITIS: ICD-10-CM

## 2025-06-16 LAB
ALBUMIN UR-MCNC: ABNORMAL G/DL
ANION GAP SERPL CALCULATED.3IONS-SCNC: 12 MMOL/L (ref 7–15)
APPEARANCE UR: CLEAR
BACTERIA #/AREA URNS HPF: ABNORMAL /HPF
BASOPHILS # BLD AUTO: 0.1 10E3/UL (ref 0–0.2)
BASOPHILS NFR BLD AUTO: 1 %
BILIRUB UR QL STRIP: ABNORMAL
BUN SERPL-MCNC: 24.5 MG/DL (ref 8–23)
CALCIUM SERPL-MCNC: 8.9 MG/DL (ref 8.8–10.4)
CHLORIDE SERPL-SCNC: 103 MMOL/L (ref 98–107)
COLOR UR AUTO: ABNORMAL
CREAT SERPL-MCNC: 1.51 MG/DL (ref 0.51–0.95)
EGFRCR SERPLBLD CKD-EPI 2021: 38 ML/MIN/1.73M2
EOSINOPHIL # BLD AUTO: 0.6 10E3/UL (ref 0–0.7)
EOSINOPHIL NFR BLD AUTO: 7 %
ERYTHROCYTE [DISTWIDTH] IN BLOOD BY AUTOMATED COUNT: 14.6 % (ref 10–15)
GLUCOSE SERPL-MCNC: 83 MG/DL (ref 70–99)
GLUCOSE UR STRIP-MCNC: ABNORMAL MG/DL
HCO3 SERPL-SCNC: 25 MMOL/L (ref 22–29)
HCT VFR BLD AUTO: 33.2 % (ref 35–47)
HGB BLD-MCNC: 10.5 G/DL (ref 11.7–15.7)
HGB UR QL STRIP: ABNORMAL
IMM GRANULOCYTES # BLD: 0.1 10E3/UL
IMM GRANULOCYTES NFR BLD: 1 %
KETONES UR STRIP-MCNC: ABNORMAL MG/DL
LEUKOCYTE ESTERASE UR QL STRIP: ABNORMAL
LYMPHOCYTES # BLD AUTO: 1.4 10E3/UL (ref 0.8–5.3)
LYMPHOCYTES NFR BLD AUTO: 16 %
MCH RBC QN AUTO: 28.7 PG (ref 26.5–33)
MCHC RBC AUTO-ENTMCNC: 31.6 G/DL (ref 31.5–36.5)
MCV RBC AUTO: 91 FL (ref 78–100)
MONOCYTES # BLD AUTO: 0.7 10E3/UL (ref 0–1.3)
MONOCYTES NFR BLD AUTO: 8 %
MUCOUS THREADS #/AREA URNS LPF: PRESENT /LPF
NEUTROPHILS # BLD AUTO: 5.6 10E3/UL (ref 1.6–8.3)
NEUTROPHILS NFR BLD AUTO: 67 %
NITRATE UR QL: ABNORMAL
NRBC # BLD AUTO: 0 10E3/UL
NRBC BLD AUTO-RTO: 0 /100
PH UR STRIP: ABNORMAL [PH]
PLATELET # BLD AUTO: 282 10E3/UL (ref 150–450)
POTASSIUM SERPL-SCNC: 5 MMOL/L (ref 3.4–5.3)
RBC # BLD AUTO: 3.66 10E6/UL (ref 3.8–5.2)
RBC URINE: 6 /HPF
SODIUM SERPL-SCNC: 140 MMOL/L (ref 135–145)
SP GR UR STRIP: 1.01 (ref 1–1.03)
SQUAMOUS EPITHELIAL: 4 /HPF
UROBILINOGEN UR STRIP-MCNC: ABNORMAL MG/DL
WBC # BLD AUTO: 8.4 10E3/UL (ref 4–11)
WBC URINE: 1 /HPF

## 2025-06-16 PROCEDURE — 96360 HYDRATION IV INFUSION INIT: CPT

## 2025-06-16 PROCEDURE — 87086 URINE CULTURE/COLONY COUNT: CPT | Performed by: PHYSICIAN ASSISTANT

## 2025-06-16 PROCEDURE — 99284 EMERGENCY DEPT VISIT MOD MDM: CPT | Mod: 25

## 2025-06-16 PROCEDURE — 81003 URINALYSIS AUTO W/O SCOPE: CPT | Performed by: PHYSICIAN ASSISTANT

## 2025-06-16 PROCEDURE — 258N000003 HC RX IP 258 OP 636: Performed by: PHYSICIAN ASSISTANT

## 2025-06-16 PROCEDURE — 80048 BASIC METABOLIC PNL TOTAL CA: CPT | Performed by: PHYSICIAN ASSISTANT

## 2025-06-16 PROCEDURE — 36415 COLL VENOUS BLD VENIPUNCTURE: CPT | Performed by: PHYSICIAN ASSISTANT

## 2025-06-16 PROCEDURE — 85004 AUTOMATED DIFF WBC COUNT: CPT | Performed by: PHYSICIAN ASSISTANT

## 2025-06-16 PROCEDURE — 74176 CT ABD & PELVIS W/O CONTRAST: CPT

## 2025-06-16 RX ORDER — TAMSULOSIN HYDROCHLORIDE 0.4 MG/1
0.4 CAPSULE ORAL DAILY
Qty: 10 CAPSULE | Refills: 0 | Status: SHIPPED | OUTPATIENT
Start: 2025-06-16 | End: 2025-06-26

## 2025-06-16 RX ADMIN — SODIUM CHLORIDE 1000 ML: 9 INJECTION, SOLUTION INTRAVENOUS at 10:35

## 2025-06-16 ASSESSMENT — ACTIVITIES OF DAILY LIVING (ADL)
ADLS_ACUITY_SCORE: 41
ADLS_ACUITY_SCORE: 41

## 2025-06-16 ASSESSMENT — COLUMBIA-SUICIDE SEVERITY RATING SCALE - C-SSRS
1. IN THE PAST MONTH, HAVE YOU WISHED YOU WERE DEAD OR WISHED YOU COULD GO TO SLEEP AND NOT WAKE UP?: NO
2. HAVE YOU ACTUALLY HAD ANY THOUGHTS OF KILLING YOURSELF IN THE PAST MONTH?: NO
6. HAVE YOU EVER DONE ANYTHING, STARTED TO DO ANYTHING, OR PREPARED TO DO ANYTHING TO END YOUR LIFE?: NO

## 2025-06-16 NOTE — DISCHARGE INSTRUCTIONS
Your imaging shows inflammation of the bowel called colitis. This is likely caused by infection or local inflammation. Continue supportive cares including rest, rehydration, and you may finish the oral antibiotics. You will be started on a medication for bladder spasms. Please take as prescribed and follow-up with urology at appointment this week. Schedule follow-up with primary care for recheck. Return to ED with worsening symptoms such as fevers, vomiting or pain.

## 2025-06-16 NOTE — ED PROVIDER NOTES
Emergency Department Note      History of Present Illness     Chief Complaint   Cystitis      HPI   Colleen Connelly is a 65 year old female with history of chronic pain and recurrent UTI who presents to the ED for evaluation of cystitis. The patient reports that she was seen at outside urgent care about 1 week ago for a recurrent UTI, and initially felt better after the visit, but a few days later the bladder pressure came back. She states that it is very hard to urinate, adding that she has to strain and feels pressure in her bladder. She reports that she has also been very constipated lately, but denies any incontinence or diarrhea. She denies any saddle numbness, fever, nausea or vomiting. She reports that she has had some body chills. She denies any use of alcohol. Patient also has a large bruise on her right flank, but denies any recent trama or falls. She reports that she has never had lower back surgery. She denies any use of blood thinners but is taking AZO for dysuria.    Independent Historian   None    Review of External Notes   I reviewed [facility] note from 6/9/25  Started on bactrim for recurrent UTI   I reviewed callback on 6/14/2025. Negative urine culture.     Past Medical History     Medical History and Problem List   Anxiety   Migraine   Osteoarthritis   Allergic rhinitis   Vitamin D deficiency   Acute encephalopathy   Depression   GERD   Insomnia   STD  TMJ  Normocytic anemia   Obstructive sleep apnea   Pes planovalgus     Medications   Asprin 81 mg   Celebrex   Catapres   Lunesta   Amoxil   Zithromax   Wellbutrin   Flexeril   Mobic   Prilosec   Bactrim   Ultram   Tessalon   Augmentin   Neurontin   Dilaudid   Protonix   Paxil   Senokot   Imitrex     Surgical History   Abdominoplasty   Cholecystectomy   Lumbar puncture x2   Cosmetic surgery   Liposuction  Gastrocnemius side right leg  - other surgery     Physical Exam     Patient Vitals for the past 24 hrs:   BP Temp Temp src Pulse Resp SpO2  "Height Weight   06/16/25 1157 115/73 -- -- 74 18 98 % -- --   06/16/25 1133 109/51 -- -- 64 18 92 % -- --   06/16/25 0852 127/75 98.2  F (36.8  C) Oral 78 17 93 % 1.676 m (5' 6\") 96 kg (211 lb 10.3 oz)     Physical Exam  Constitutional: Alert, attentive, GCS 15  HENT:    Nose: Nose normal.    Mouth/Throat: Oropharynx is clear, mucous membranes are moist   Eyes: EOM are normal. Pupils equal and reactive.  Neck: Normal range of motion. No rigidity.  CV: regular rate and rhythm; no murmurs, rubs or gallups  Chest: Effort normal and breath sounds normal.   GI:  There is no tenderness or rebound. No distension. Normal bowel sounds.  MSK: Normal range of motion.   Neurological: Alert, attentive  Skin: Skin is warm and dry. Moderate ecchymosis to right flank.    Diagnostics     Lab Results   Labs Ordered and Resulted from Time of ED Arrival to Time of ED Departure   ROUTINE UA WITH MICROSCOPIC REFLEX TO CULTURE - Abnormal       Result Value    Color Urine Dark Yellow (*)     Appearance Urine Clear      Glucose Urine        Bilirubin Urine        Ketones Urine        Specific Gravity Urine 1.010      Blood Urine        pH Urine        Protein Albumin Urine        Urobilinogen Urine        Nitrite Urine        Leukocyte Esterase Urine        Bacteria Urine Few (*)     Mucus Urine Present (*)     RBC Urine 6 (*)     WBC Urine 1      Squamous Epithelials Urine 4 (*)    BASIC METABOLIC PANEL - Abnormal    Sodium 140      Potassium 5.0      Chloride 103      Carbon Dioxide (CO2) 25      Anion Gap 12      Urea Nitrogen 24.5 (*)     Creatinine 1.51 (*)     GFR Estimate 38 (*)     Calcium 8.9      Glucose 83     CBC WITH PLATELETS AND DIFFERENTIAL - Abnormal    WBC Count 8.4      RBC Count 3.66 (*)     Hemoglobin 10.5 (*)     Hematocrit 33.2 (*)     MCV 91      MCH 28.7      MCHC 31.6      RDW 14.6      Platelet Count 282      % Neutrophils 67      % Lymphocytes 16      % Monocytes 8      % Eosinophils 7      % Basophils 1      % " Immature Granulocytes 1      NRBCs per 100 WBC 0      Absolute Neutrophils 5.6      Absolute Lymphocytes 1.4      Absolute Monocytes 0.7      Absolute Eosinophils 0.6      Absolute Basophils 0.1      Absolute Immature Granulocytes 0.1      Absolute NRBCs 0.0     URINE CULTURE       Imaging   Abd/pelvis CT no contrast - Stone Protocol   Final Result   IMPRESSION:    1.  There is a short segment of mild bowel wall thickening and surrounding fat stranding in the ascending colon, suspicious for a mild nonspecific focal colitis.   2.  Sigmoid diverticulosis, without evidence for diverticulitis.                EKG   None     Independent Interpretation   None    ED Course      Medications Administered   Medications   sodium chloride 0.9% BOLUS 1,000 mL (0 mLs Intravenous Stopped 6/16/25 1133)       Procedures   Procedures     Discussion of Management   Clinical Pharmacist, Discussed medication plan     ED Course   ED Course as of 06/16/25 1528   Mon Jun 16, 2025   0943 I obtained history and examined the patient as noted above.    1100 I consulted with Pharmacy. Discussed medications for bladder spasms.    1141 I rechecked and updated the patient.        Additional Documentation  None    Medical Decision Making / Diagnosis     CMS Diagnoses: None    MIPS   None           MDM   Colleen Connelly is a 65 year old female with chronic pain, normocytic anemia, and recurrent UTI who presents for evaluation of bladder pressure, pain, and right flank bruise.  She is afebrile, normotensive, nonhypoxic.  Physical examination reveals no focal abdominal tenderness and no peritoneal signs.  There is a  bruise to the soft tissue of the right flank without reproducible tenderness. Patient denies falls or trauma.  Differential includes cystitis/UTI, pyelonephritis, nephrolithiasis.  Labs reveal no leukocytosis.  Hemoglobin is at 10.5 however this appears to be patient's baseline.  UA is unable to be analyzed secondary to AZO use.  A urine  culture will be sent. I reviewed her most recent urine culture that was negative. There is evidence of acute kidney injury with creatinine up at 1.51.  She is provided with IV fluids.  CT scan of the abdomen reveals mild ascending colitis.  No evidence of nephrolithiasis, pyelonephritis, perinephric hematoma, diverticulitis, bowel obstruction, or perforation.  Results reviewed patient at bedside.  She reports no diarrhea and I have low suspicion for C. difficile colitis. Recommend rest, bland diet with slow advancement and follow-up with primary. No evidence of UTI at this time however she may finish her Bactrim prescription and will wait for urine culture.  Her bladder scan reveals 278 mL and I have low suspicion for retention.   I suspect she is having mild bladder spasms given the pressure sensation.  I spoke with pharmacy about appropriate medication for bladder spasms and will start Flomax. She has appointment with urology this week which is excellent for follow-up.  Recommend recheck of her kidney function this week with primary care and to continue to rehydrate at home. Patient is comfortable with above plan and she is discharged home    Disposition   The patient was discharged.     Diagnosis     ICD-10-CM    1. Colitis  K52.9       2. Bladder spasm  N32.89       3. Acute kidney injury  N17.9       4. Contusion of trunk, initial encounter  S20.20XA            Discharge Medications   Discharge Medication List as of 6/16/2025 11:51 AM        START taking these medications    Details   tamsulosin (FLOMAX) 0.4 MG capsule Take 1 capsule (0.4 mg) by mouth daily for 10 doses., Disp-10 capsule, R-0, E-Prescribe               Scribe Disclosure:  IHussein, am serving as a scribe at 9:44 AM on 6/16/2025 to document services personally performed by Alexandra Gomez PA-C based on my observations and the provider's statements to me.        Alexandra Gomez PA-C  06/16/25 1582

## 2025-06-16 NOTE — ED TRIAGE NOTES
Recently Pt has been having frequent UTIs. Pt states she has been taking ABX with no relief. Pt endorses bladder spasms and pain on urination. Pt is currently on an antibiotic. Pt also endorses pain in her mid back, near kidney area. Pt is noted to have a bruise on her back. Estimated to be size of a half dollar. Denies any trauma

## 2025-06-17 ENCOUNTER — RESULTS FOLLOW-UP (OUTPATIENT)
Dept: NURSING | Facility: CLINIC | Age: 65
End: 2025-06-17

## 2025-06-17 LAB — BACTERIA UR CULT: NORMAL

## 2025-08-31 ENCOUNTER — HOSPITAL ENCOUNTER (EMERGENCY)
Facility: CLINIC | Age: 65
Discharge: HOME | End: 2025-08-31
Attending: EMERGENCY MEDICINE | Admitting: EMERGENCY MEDICINE
Payer: COMMERCIAL

## 2025-08-31 VITALS
OXYGEN SATURATION: 98 % | HEART RATE: 95 BPM | RESPIRATION RATE: 16 BRPM | TEMPERATURE: 98.2 F | DIASTOLIC BLOOD PRESSURE: 99 MMHG | SYSTOLIC BLOOD PRESSURE: 139 MMHG

## 2025-08-31 DIAGNOSIS — R39.11 URINARY HESITANCY: Primary | ICD-10-CM

## 2025-08-31 LAB
ALBUMIN UR-MCNC: NEGATIVE MG/DL
APPEARANCE UR: CLEAR
BILIRUB UR QL STRIP: NEGATIVE
COLOR UR AUTO: ABNORMAL
GLUCOSE UR STRIP-MCNC: NEGATIVE MG/DL
HGB UR QL STRIP: NEGATIVE
KETONES UR STRIP-MCNC: NEGATIVE MG/DL
LEUKOCYTE ESTERASE UR QL STRIP: NEGATIVE
NITRATE UR QL: NEGATIVE
PH UR STRIP: 6.5 [PH] (ref 5–7)
RBC URINE: <1 /HPF
SP GR UR STRIP: 1.01 (ref 1–1.03)
SQUAMOUS EPITHELIAL: 2 /HPF
UROBILINOGEN UR STRIP-MCNC: NORMAL MG/DL
WBC URINE: <1 /HPF

## 2025-08-31 PROCEDURE — 81001 URINALYSIS AUTO W/SCOPE: CPT | Performed by: EMERGENCY MEDICINE

## 2025-08-31 PROCEDURE — 99283 EMERGENCY DEPT VISIT LOW MDM: CPT | Performed by: EMERGENCY MEDICINE

## 2025-08-31 RX ORDER — TAMSULOSIN HYDROCHLORIDE 0.4 MG/1
0.4 CAPSULE ORAL DAILY
Qty: 30 CAPSULE | Refills: 0 | Status: SHIPPED | OUTPATIENT
Start: 2025-08-31

## 2025-08-31 ASSESSMENT — COLUMBIA-SUICIDE SEVERITY RATING SCALE - C-SSRS
1. IN THE PAST MONTH, HAVE YOU WISHED YOU WERE DEAD OR WISHED YOU COULD GO TO SLEEP AND NOT WAKE UP?: NO
6. HAVE YOU EVER DONE ANYTHING, STARTED TO DO ANYTHING, OR PREPARED TO DO ANYTHING TO END YOUR LIFE?: NO
2. HAVE YOU ACTUALLY HAD ANY THOUGHTS OF KILLING YOURSELF IN THE PAST MONTH?: NO

## 2025-08-31 ASSESSMENT — ACTIVITIES OF DAILY LIVING (ADL)
ADLS_ACUITY_SCORE: 41
ADLS_ACUITY_SCORE: 41